# Patient Record
Sex: MALE | Race: WHITE | NOT HISPANIC OR LATINO | Employment: OTHER | URBAN - NONMETROPOLITAN AREA
[De-identification: names, ages, dates, MRNs, and addresses within clinical notes are randomized per-mention and may not be internally consistent; named-entity substitution may affect disease eponyms.]

---

## 2022-07-09 ENCOUNTER — HOSPITAL ENCOUNTER (INPATIENT)
Facility: HOSPITAL | Age: 72
LOS: 7 days | Discharge: HOME HEALTH | End: 2022-07-16
Attending: INTERNAL MEDICINE | Admitting: FAMILY MEDICINE

## 2022-07-09 ENCOUNTER — APPOINTMENT (OUTPATIENT)
Dept: GENERAL RADIOLOGY | Facility: HOSPITAL | Age: 72
End: 2022-07-09

## 2022-07-09 ENCOUNTER — APPOINTMENT (OUTPATIENT)
Dept: ULTRASOUND IMAGING | Facility: HOSPITAL | Age: 72
End: 2022-07-09

## 2022-07-09 ENCOUNTER — APPOINTMENT (OUTPATIENT)
Dept: CARDIOLOGY | Facility: HOSPITAL | Age: 72
End: 2022-07-09

## 2022-07-09 DIAGNOSIS — Z74.09 IMPAIRED MOBILITY AND ADLS: ICD-10-CM

## 2022-07-09 DIAGNOSIS — Z78.9 IMPAIRED MOBILITY AND ADLS: ICD-10-CM

## 2022-07-09 DIAGNOSIS — Z74.09 IMPAIRED MOBILITY: ICD-10-CM

## 2022-07-09 PROBLEM — E87.70 FLUID OVERLOAD: Status: ACTIVE | Noted: 2022-07-09

## 2022-07-09 LAB
ALBUMIN SERPL-MCNC: 2.7 G/DL (ref 3.5–5.2)
ALBUMIN/GLOB SERPL: 0.8 G/DL
ALP SERPL-CCNC: 122 U/L (ref 39–117)
ALT SERPL W P-5'-P-CCNC: 37 U/L (ref 1–41)
ANION GAP SERPL CALCULATED.3IONS-SCNC: 15 MMOL/L (ref 5–15)
ANION GAP SERPL CALCULATED.3IONS-SCNC: 16 MMOL/L (ref 5–15)
ANION GAP SERPL CALCULATED.3IONS-SCNC: 22 MMOL/L (ref 5–15)
ANISOCYTOSIS BLD QL: ABNORMAL
APTT PPP: 32.8 SECONDS (ref 24.1–35)
ARTERIAL PATENCY WRIST A: ABNORMAL
AST SERPL-CCNC: 18 U/L (ref 1–40)
ATMOSPHERIC PRESS: 749 MMHG
BASE EXCESS BLDA CALC-SCNC: -9.8 MMOL/L (ref 0–2)
BDY SITE: ABNORMAL
BH CV ECHO MEAS - AO MAX PG: 12.5 MMHG
BH CV ECHO MEAS - AO MEAN PG: 6 MMHG
BH CV ECHO MEAS - AO ROOT DIAM: 2.4 CM
BH CV ECHO MEAS - AO V2 MAX: 177 CM/SEC
BH CV ECHO MEAS - AO V2 VTI: 33 CM
BH CV ECHO MEAS - AVA(I,D): 2.46 CM2
BH CV ECHO MEAS - EDV(CUBED): 87.5 ML
BH CV ECHO MEAS - EDV(MOD-SP4): 121 ML
BH CV ECHO MEAS - EF(MOD-SP4): 68.3 %
BH CV ECHO MEAS - ESV(CUBED): 9.4 ML
BH CV ECHO MEAS - ESV(MOD-SP4): 38.3 ML
BH CV ECHO MEAS - FS: 52.5 %
BH CV ECHO MEAS - IVS/LVPW: 0.93 CM
BH CV ECHO MEAS - IVSD: 1 CM
BH CV ECHO MEAS - LA DIMENSION: 3.7 CM
BH CV ECHO MEAS - LAT PEAK E' VEL: 10.1 CM/SEC
BH CV ECHO MEAS - LV DIASTOLIC VOL/BSA (35-75): 60.3 CM2
BH CV ECHO MEAS - LV MASS(C)D: 157.3 GRAMS
BH CV ECHO MEAS - LV MAX PG: 6.5 MMHG
BH CV ECHO MEAS - LV MEAN PG: 4 MMHG
BH CV ECHO MEAS - LV SYSTOLIC VOL/BSA (12-30): 19.1 CM2
BH CV ECHO MEAS - LV V1 MAX: 127 CM/SEC
BH CV ECHO MEAS - LV V1 VTI: 25.8 CM
BH CV ECHO MEAS - LVIDD: 4.4 CM
BH CV ECHO MEAS - LVIDS: 2.11 CM
BH CV ECHO MEAS - LVOT AREA: 3.1 CM2
BH CV ECHO MEAS - LVOT DIAM: 2 CM
BH CV ECHO MEAS - LVPWD: 1.07 CM
BH CV ECHO MEAS - MED PEAK E' VEL: 6.6 CM/SEC
BH CV ECHO MEAS - MV A MAX VEL: 102 CM/SEC
BH CV ECHO MEAS - MV DEC TIME: 0.19 MSEC
BH CV ECHO MEAS - MV E MAX VEL: 122 CM/SEC
BH CV ECHO MEAS - MV E/A: 1.2
BH CV ECHO MEAS - RAP SYSTOLE: 5 MMHG
BH CV ECHO MEAS - RVSP: 55.1 MMHG
BH CV ECHO MEAS - SI(MOD-SP4): 41.2 ML/M2
BH CV ECHO MEAS - SV(LVOT): 81.1 ML
BH CV ECHO MEAS - SV(MOD-SP4): 82.7 ML
BH CV ECHO MEAS - TR MAX PG: 50.1 MMHG
BH CV ECHO MEAS - TR MAX VEL: 354 CM/SEC
BH CV ECHO MEASUREMENTS AVERAGE E/E' RATIO: 14.61
BILIRUB SERPL-MCNC: 0.3 MG/DL (ref 0–1.2)
BILIRUB UR QL STRIP: NEGATIVE
BODY TEMPERATURE: 37 C
BUN SERPL-MCNC: 50 MG/DL (ref 8–23)
BUN SERPL-MCNC: 50 MG/DL (ref 8–23)
BUN SERPL-MCNC: 51 MG/DL (ref 8–23)
BUN/CREAT SERPL: 17 (ref 7–25)
BUN/CREAT SERPL: 17.6 (ref 7–25)
BUN/CREAT SERPL: 18.4 (ref 7–25)
BURR CELLS BLD QL SMEAR: ABNORMAL
CALCIUM SPEC-SCNC: 8 MG/DL (ref 8.6–10.5)
CALCIUM SPEC-SCNC: 8.3 MG/DL (ref 8.6–10.5)
CALCIUM SPEC-SCNC: 8.4 MG/DL (ref 8.6–10.5)
CHLORIDE SERPL-SCNC: 98 MMOL/L (ref 98–107)
CHLORIDE SERPL-SCNC: 99 MMOL/L (ref 98–107)
CHLORIDE SERPL-SCNC: 99 MMOL/L (ref 98–107)
CLARITY UR: CLEAR
CO2 SERPL-SCNC: 12 MMOL/L (ref 22–29)
CO2 SERPL-SCNC: 17 MMOL/L (ref 22–29)
CO2 SERPL-SCNC: 21 MMOL/L (ref 22–29)
COLOR UR: YELLOW
CREAT SERPL-MCNC: 2.77 MG/DL (ref 0.76–1.27)
CREAT SERPL-MCNC: 2.84 MG/DL (ref 0.76–1.27)
CREAT SERPL-MCNC: 2.94 MG/DL (ref 0.76–1.27)
CREAT UR-MCNC: 104.2 MG/DL
CRP SERPL-MCNC: 19.55 MG/DL (ref 0–0.5)
D-LACTATE SERPL-SCNC: 1.2 MMOL/L (ref 0.5–2)
DEPRECATED RDW RBC AUTO: 57.2 FL (ref 37–54)
EGFRCR SERPLBLD CKD-EPI 2021: 22.2 ML/MIN/1.73
EGFRCR SERPLBLD CKD-EPI 2021: 23.1 ML/MIN/1.73
EGFRCR SERPLBLD CKD-EPI 2021: 23.8 ML/MIN/1.73
EOSINOPHIL # BLD MANUAL: 0.63 10*3/MM3 (ref 0–0.4)
EOSINOPHIL NFR BLD MANUAL: 4 % (ref 0.3–6.2)
ERYTHROCYTE [DISTWIDTH] IN BLOOD BY AUTOMATED COUNT: 17 % (ref 12.3–15.4)
GAS FLOW AIRWAY: 4 LPM
GLOBULIN UR ELPH-MCNC: 3.6 GM/DL
GLUCOSE BLDC GLUCOMTR-MCNC: 139 MG/DL (ref 70–130)
GLUCOSE BLDC GLUCOMTR-MCNC: 171 MG/DL (ref 70–130)
GLUCOSE BLDC GLUCOMTR-MCNC: 233 MG/DL (ref 70–130)
GLUCOSE BLDC GLUCOMTR-MCNC: 255 MG/DL (ref 70–130)
GLUCOSE BLDC GLUCOMTR-MCNC: 313 MG/DL (ref 70–130)
GLUCOSE BLDC GLUCOMTR-MCNC: 324 MG/DL (ref 70–130)
GLUCOSE BLDC GLUCOMTR-MCNC: 394 MG/DL (ref 70–130)
GLUCOSE BLDC GLUCOMTR-MCNC: 400 MG/DL (ref 70–130)
GLUCOSE BLDC GLUCOMTR-MCNC: 433 MG/DL (ref 70–130)
GLUCOSE BLDC GLUCOMTR-MCNC: 539 MG/DL (ref 70–130)
GLUCOSE BLDC GLUCOMTR-MCNC: 547 MG/DL (ref 70–130)
GLUCOSE SERPL-MCNC: 367 MG/DL (ref 65–99)
GLUCOSE SERPL-MCNC: 487 MG/DL (ref 65–99)
GLUCOSE SERPL-MCNC: 494 MG/DL (ref 65–99)
GLUCOSE SERPL-MCNC: 494 MG/DL (ref 65–99)
GLUCOSE UR STRIP-MCNC: ABNORMAL MG/DL
HBA1C MFR BLD: 7.5 % (ref 4.8–5.6)
HCO3 BLDA-SCNC: 18.6 MMOL/L (ref 20–26)
HCT VFR BLD AUTO: 28.3 % (ref 37.5–51)
HGB BLD-MCNC: 8.2 G/DL (ref 13–17.7)
HGB UR QL STRIP.AUTO: NEGATIVE
INR PPP: 1.2 (ref 0.91–1.09)
KETONES UR QL STRIP: ABNORMAL
LEFT ATRIUM VOLUME INDEX: 32.8 ML/M2
LEFT ATRIUM VOLUME: 66 ML
LEUKOCYTE ESTERASE UR QL STRIP.AUTO: NEGATIVE
LYMPHOCYTES # BLD MANUAL: 0.94 10*3/MM3 (ref 0.7–3.1)
LYMPHOCYTES NFR BLD MANUAL: 4 % (ref 5–12)
Lab: ABNORMAL
Lab: ABNORMAL
MAXIMAL PREDICTED HEART RATE: 150 BPM
MCH RBC QN AUTO: 27 PG (ref 26.6–33)
MCHC RBC AUTO-ENTMCNC: 29 G/DL (ref 31.5–35.7)
MCV RBC AUTO: 93.1 FL (ref 79–97)
MODALITY: ABNORMAL
MONOCYTES # BLD: 0.63 10*3/MM3 (ref 0.1–0.9)
MYELOCYTES NFR BLD MANUAL: 1 % (ref 0–0)
NEUTROPHILS # BLD AUTO: 13.29 10*3/MM3 (ref 1.7–7)
NEUTROPHILS NFR BLD MANUAL: 85 % (ref 42.7–76)
NITRITE UR QL STRIP: NEGATIVE
NOTIFIED BY: ABNORMAL
NOTIFIED WHO: ABNORMAL
PCO2 BLDA: 52.3 MM HG (ref 35–45)
PCO2 TEMP ADJ BLD: 52.3 MM HG (ref 35–45)
PH BLDA: 7.16 PH UNITS (ref 7.35–7.45)
PH UR STRIP.AUTO: <=5 [PH] (ref 5–8)
PH, TEMP CORRECTED: 7.16 PH UNITS (ref 7.35–7.45)
PLAT MORPH BLD: NORMAL
PLATELET # BLD AUTO: 252 10*3/MM3 (ref 140–450)
PMV BLD AUTO: 11 FL (ref 6–12)
PO2 BLDA: 67 MM HG (ref 83–108)
PO2 TEMP ADJ BLD: 67 MM HG (ref 83–108)
POIKILOCYTOSIS BLD QL SMEAR: ABNORMAL
POLYCHROMASIA BLD QL SMEAR: ABNORMAL
POTASSIUM SERPL-SCNC: 6 MMOL/L (ref 3.5–5.2)
POTASSIUM SERPL-SCNC: 6 MMOL/L (ref 3.5–5.2)
POTASSIUM SERPL-SCNC: 6.8 MMOL/L (ref 3.5–5.2)
PROCALCITONIN SERPL-MCNC: 1.2 NG/ML (ref 0–0.25)
PROT SERPL-MCNC: 6.3 G/DL (ref 6–8.5)
PROT UR QL STRIP: NEGATIVE
PROTHROMBIN TIME: 14.7 SECONDS (ref 11.9–14.6)
RBC # BLD AUTO: 3.04 10*6/MM3 (ref 4.14–5.8)
SAO2 % BLDCOA: 91.3 % (ref 94–99)
SODIUM SERPL-SCNC: 131 MMOL/L (ref 136–145)
SODIUM SERPL-SCNC: 132 MMOL/L (ref 136–145)
SODIUM SERPL-SCNC: 136 MMOL/L (ref 136–145)
SODIUM UR-SCNC: 30 MMOL/L
SP GR UR STRIP: 1.02 (ref 1–1.03)
STRESS TARGET HR: 128 BPM
TROPONIN T SERPL-MCNC: <0.01 NG/ML (ref 0–0.03)
TROPONIN T SERPL-MCNC: <0.01 NG/ML (ref 0–0.03)
UROBILINOGEN UR QL STRIP: ABNORMAL
UUN 24H UR-MCNC: 419 MG/DL
VARIANT LYMPHS NFR BLD MANUAL: 6 % (ref 19.6–45.3)
VENTILATOR MODE: ABNORMAL
WBC MORPH BLD: NORMAL
WBC NRBC COR # BLD: 15.63 10*3/MM3 (ref 3.4–10.8)

## 2022-07-09 PROCEDURE — 84300 ASSAY OF URINE SODIUM: CPT | Performed by: INTERNAL MEDICINE

## 2022-07-09 PROCEDURE — 85610 PROTHROMBIN TIME: CPT | Performed by: INTERNAL MEDICINE

## 2022-07-09 PROCEDURE — 82962 GLUCOSE BLOOD TEST: CPT

## 2022-07-09 PROCEDURE — 63710000001 INSULIN REGULAR HUMAN PER 5 UNITS: Performed by: INTERNAL MEDICINE

## 2022-07-09 PROCEDURE — 94799 UNLISTED PULMONARY SVC/PX: CPT

## 2022-07-09 PROCEDURE — 94640 AIRWAY INHALATION TREATMENT: CPT

## 2022-07-09 PROCEDURE — 93010 ELECTROCARDIOGRAM REPORT: CPT | Performed by: INTERNAL MEDICINE

## 2022-07-09 PROCEDURE — 83605 ASSAY OF LACTIC ACID: CPT | Performed by: INTERNAL MEDICINE

## 2022-07-09 PROCEDURE — 83036 HEMOGLOBIN GLYCOSYLATED A1C: CPT | Performed by: INTERNAL MEDICINE

## 2022-07-09 PROCEDURE — 86140 C-REACTIVE PROTEIN: CPT | Performed by: INTERNAL MEDICINE

## 2022-07-09 PROCEDURE — 93306 TTE W/DOPPLER COMPLETE: CPT

## 2022-07-09 PROCEDURE — 85007 BL SMEAR W/DIFF WBC COUNT: CPT | Performed by: INTERNAL MEDICINE

## 2022-07-09 PROCEDURE — 84484 ASSAY OF TROPONIN QUANT: CPT | Performed by: INTERNAL MEDICINE

## 2022-07-09 PROCEDURE — 81003 URINALYSIS AUTO W/O SCOPE: CPT | Performed by: INTERNAL MEDICINE

## 2022-07-09 PROCEDURE — 36600 WITHDRAWAL OF ARTERIAL BLOOD: CPT

## 2022-07-09 PROCEDURE — 80053 COMPREHEN METABOLIC PANEL: CPT | Performed by: INTERNAL MEDICINE

## 2022-07-09 PROCEDURE — 25010000002 FUROSEMIDE PER 20 MG: Performed by: INTERNAL MEDICINE

## 2022-07-09 PROCEDURE — 82803 BLOOD GASES ANY COMBINATION: CPT

## 2022-07-09 PROCEDURE — 84540 ASSAY OF URINE/UREA-N: CPT | Performed by: INTERNAL MEDICINE

## 2022-07-09 PROCEDURE — 93970 EXTREMITY STUDY: CPT | Performed by: SURGERY

## 2022-07-09 PROCEDURE — 82570 ASSAY OF URINE CREATININE: CPT | Performed by: INTERNAL MEDICINE

## 2022-07-09 PROCEDURE — 87040 BLOOD CULTURE FOR BACTERIA: CPT | Performed by: INTERNAL MEDICINE

## 2022-07-09 PROCEDURE — 25010000002 CALCIUM GLUCONATE PER 10 ML: Performed by: INTERNAL MEDICINE

## 2022-07-09 PROCEDURE — 93306 TTE W/DOPPLER COMPLETE: CPT | Performed by: INTERNAL MEDICINE

## 2022-07-09 PROCEDURE — 25010000002 ONDANSETRON PER 1 MG: Performed by: INTERNAL MEDICINE

## 2022-07-09 PROCEDURE — 0 INSULIN REGULAR HUMAN PER 5 UNITS: Performed by: INTERNAL MEDICINE

## 2022-07-09 PROCEDURE — 84145 PROCALCITONIN (PCT): CPT | Performed by: INTERNAL MEDICINE

## 2022-07-09 PROCEDURE — 71045 X-RAY EXAM CHEST 1 VIEW: CPT

## 2022-07-09 PROCEDURE — 93005 ELECTROCARDIOGRAM TRACING: CPT | Performed by: INTERNAL MEDICINE

## 2022-07-09 PROCEDURE — 85730 THROMBOPLASTIN TIME PARTIAL: CPT | Performed by: INTERNAL MEDICINE

## 2022-07-09 PROCEDURE — 94660 CPAP INITIATION&MGMT: CPT

## 2022-07-09 PROCEDURE — 25010000002 CEFTRIAXONE PER 250 MG: Performed by: INTERNAL MEDICINE

## 2022-07-09 PROCEDURE — 85025 COMPLETE CBC W/AUTO DIFF WBC: CPT | Performed by: INTERNAL MEDICINE

## 2022-07-09 PROCEDURE — 25010000002 PERFLUTREN 6.52 MG/ML SUSPENSION: Performed by: INTERNAL MEDICINE

## 2022-07-09 PROCEDURE — 93970 EXTREMITY STUDY: CPT

## 2022-07-09 RX ORDER — ACETAMINOPHEN 325 MG/1
650 TABLET ORAL EVERY 6 HOURS PRN
Status: DISCONTINUED | OUTPATIENT
Start: 2022-07-09 | End: 2022-07-16 | Stop reason: HOSPADM

## 2022-07-09 RX ORDER — SODIUM CHLORIDE 0.9 % (FLUSH) 0.9 %
10 SYRINGE (ML) INJECTION EVERY 12 HOURS SCHEDULED
Status: DISCONTINUED | OUTPATIENT
Start: 2022-07-09 | End: 2022-07-16 | Stop reason: HOSPADM

## 2022-07-09 RX ORDER — DEXTROSE MONOHYDRATE 25 G/50ML
25 INJECTION, SOLUTION INTRAVENOUS
Status: DISCONTINUED | OUTPATIENT
Start: 2022-07-09 | End: 2022-07-14 | Stop reason: SDUPTHER

## 2022-07-09 RX ORDER — IPRATROPIUM BROMIDE AND ALBUTEROL SULFATE 2.5; .5 MG/3ML; MG/3ML
3 SOLUTION RESPIRATORY (INHALATION)
Status: DISCONTINUED | OUTPATIENT
Start: 2022-07-09 | End: 2022-07-16 | Stop reason: HOSPADM

## 2022-07-09 RX ORDER — ONDANSETRON 2 MG/ML
4 INJECTION INTRAMUSCULAR; INTRAVENOUS EVERY 6 HOURS PRN
Status: DISCONTINUED | OUTPATIENT
Start: 2022-07-09 | End: 2022-07-16 | Stop reason: HOSPADM

## 2022-07-09 RX ORDER — NICOTINE POLACRILEX 4 MG
15 LOZENGE BUCCAL
Status: DISCONTINUED | OUTPATIENT
Start: 2022-07-09 | End: 2022-07-14 | Stop reason: SDUPTHER

## 2022-07-09 RX ORDER — FAMOTIDINE 10 MG/ML
20 INJECTION, SOLUTION INTRAVENOUS DAILY
Status: DISCONTINUED | OUTPATIENT
Start: 2022-07-10 | End: 2022-07-11

## 2022-07-09 RX ORDER — ALPRAZOLAM 0.5 MG/1
1 TABLET ORAL 3 TIMES DAILY PRN
Status: DISPENSED | OUTPATIENT
Start: 2022-07-09 | End: 2022-07-16

## 2022-07-09 RX ORDER — SODIUM CHLORIDE 0.9 % (FLUSH) 0.9 %
10 SYRINGE (ML) INJECTION EVERY 12 HOURS SCHEDULED
Status: DISCONTINUED | OUTPATIENT
Start: 2022-07-09 | End: 2022-07-10

## 2022-07-09 RX ORDER — INSULIN LISPRO 100 [IU]/ML
0-24 INJECTION, SOLUTION INTRAVENOUS; SUBCUTANEOUS
Status: DISCONTINUED | OUTPATIENT
Start: 2022-07-09 | End: 2022-07-09

## 2022-07-09 RX ORDER — SODIUM CHLORIDE 0.9 % (FLUSH) 0.9 %
10 SYRINGE (ML) INJECTION AS NEEDED
Status: DISCONTINUED | OUTPATIENT
Start: 2022-07-09 | End: 2022-07-16 | Stop reason: HOSPADM

## 2022-07-09 RX ORDER — DEXTROSE MONOHYDRATE 25 G/50ML
10-50 INJECTION, SOLUTION INTRAVENOUS
Status: DISCONTINUED | OUTPATIENT
Start: 2022-07-09 | End: 2022-07-09 | Stop reason: SDUPTHER

## 2022-07-09 RX ORDER — FUROSEMIDE 10 MG/ML
40 INJECTION INTRAMUSCULAR; INTRAVENOUS EVERY 12 HOURS SCHEDULED
Status: DISCONTINUED | OUTPATIENT
Start: 2022-07-09 | End: 2022-07-09

## 2022-07-09 RX ORDER — SODIUM CHLORIDE 450 MG/100ML
125 INJECTION, SOLUTION INTRAVENOUS CONTINUOUS
Status: DISCONTINUED | OUTPATIENT
Start: 2022-07-09 | End: 2022-07-10

## 2022-07-09 RX ORDER — SODIUM CHLORIDE 0.9 % (FLUSH) 0.9 %
10 SYRINGE (ML) INJECTION AS NEEDED
Status: DISCONTINUED | OUTPATIENT
Start: 2022-07-09 | End: 2022-07-14 | Stop reason: SDUPTHER

## 2022-07-09 RX ORDER — DEXTROSE AND SODIUM CHLORIDE 5; .45 G/100ML; G/100ML
75 INJECTION, SOLUTION INTRAVENOUS CONTINUOUS
Status: DISCONTINUED | OUTPATIENT
Start: 2022-07-09 | End: 2022-07-10

## 2022-07-09 RX ORDER — FAMOTIDINE 10 MG/ML
20 INJECTION, SOLUTION INTRAVENOUS EVERY 12 HOURS SCHEDULED
Status: DISCONTINUED | OUTPATIENT
Start: 2022-07-09 | End: 2022-07-09

## 2022-07-09 RX ORDER — FUROSEMIDE 10 MG/ML
40 INJECTION INTRAMUSCULAR; INTRAVENOUS EVERY 12 HOURS SCHEDULED
Status: DISCONTINUED | OUTPATIENT
Start: 2022-07-09 | End: 2022-07-12

## 2022-07-09 RX ORDER — OXYCODONE HYDROCHLORIDE 5 MG/1
5 TABLET ORAL EVERY 8 HOURS PRN
Status: DISPENSED | OUTPATIENT
Start: 2022-07-09 | End: 2022-07-16

## 2022-07-09 RX ORDER — FUROSEMIDE 10 MG/ML
40 INJECTION INTRAMUSCULAR; INTRAVENOUS EVERY 12 HOURS
Status: DISCONTINUED | OUTPATIENT
Start: 2022-07-09 | End: 2022-07-09

## 2022-07-09 RX ORDER — NICOTINE POLACRILEX 4 MG
15 LOZENGE BUCCAL
Status: DISCONTINUED | OUTPATIENT
Start: 2022-07-09 | End: 2022-07-09 | Stop reason: SDUPTHER

## 2022-07-09 RX ORDER — DEXTROSE MONOHYDRATE 25 G/50ML
50 INJECTION, SOLUTION INTRAVENOUS ONCE
Status: COMPLETED | OUTPATIENT
Start: 2022-07-09 | End: 2022-07-09

## 2022-07-09 RX ADMIN — IPRATROPIUM BROMIDE AND ALBUTEROL SULFATE 3 ML: 2.5; .5 SOLUTION RESPIRATORY (INHALATION) at 19:10

## 2022-07-09 RX ADMIN — SODIUM CHLORIDE 6.8 UNITS/HR: 9 INJECTION, SOLUTION INTRAVENOUS at 17:17

## 2022-07-09 RX ADMIN — SODIUM CHLORIDE 1 G: 9 INJECTION, SOLUTION INTRAVENOUS at 13:01

## 2022-07-09 RX ADMIN — SODIUM BICARBONATE 50 MEQ: 84 INJECTION INTRAVENOUS at 17:23

## 2022-07-09 RX ADMIN — FUROSEMIDE 40 MG: 10 INJECTION, SOLUTION INTRAVENOUS at 13:02

## 2022-07-09 RX ADMIN — SODIUM ZIRCONIUM CYCLOSILICATE 10 G: 10 POWDER, FOR SUSPENSION ORAL at 21:17

## 2022-07-09 RX ADMIN — INSULIN HUMAN 10 UNITS: 100 INJECTION, SOLUTION PARENTERAL at 13:01

## 2022-07-09 RX ADMIN — INSULIN HUMAN 10 UNITS: 100 INJECTION, SOLUTION PARENTERAL at 13:49

## 2022-07-09 RX ADMIN — PERFLUTREN 8.48 MG: 6.52 INJECTION, SUSPENSION INTRAVENOUS at 13:26

## 2022-07-09 RX ADMIN — ALPRAZOLAM 1 MG: 0.5 TABLET ORAL at 16:14

## 2022-07-09 RX ADMIN — DEXTROSE AND SODIUM CHLORIDE 75 ML/HR: 5; 450 INJECTION, SOLUTION INTRAVENOUS at 23:16

## 2022-07-09 RX ADMIN — DOXYCYCLINE 100 MG: 100 INJECTION, POWDER, LYOPHILIZED, FOR SOLUTION INTRAVENOUS at 14:59

## 2022-07-09 RX ADMIN — Medication 10 ML: at 13:02

## 2022-07-09 RX ADMIN — FUROSEMIDE 40 MG: 10 INJECTION, SOLUTION INTRAMUSCULAR; INTRAVENOUS at 21:16

## 2022-07-09 RX ADMIN — Medication 10 ML: at 21:20

## 2022-07-09 RX ADMIN — ONDANSETRON 4 MG: 2 INJECTION INTRAMUSCULAR; INTRAVENOUS at 18:18

## 2022-07-09 RX ADMIN — SODIUM ZIRCONIUM CYCLOSILICATE 10 G: 10 POWDER, FOR SUSPENSION ORAL at 16:57

## 2022-07-09 RX ADMIN — CALCIUM GLUCONATE 1 G: 98 INJECTION, SOLUTION INTRAVENOUS at 13:02

## 2022-07-09 RX ADMIN — FAMOTIDINE 20 MG: 10 INJECTION, SOLUTION INTRAVENOUS at 13:00

## 2022-07-09 RX ADMIN — SODIUM CHLORIDE 125 ML/HR: 4.5 INJECTION, SOLUTION INTRAVENOUS at 18:37

## 2022-07-09 RX ADMIN — SODIUM BICARBONATE: 84 INJECTION, SOLUTION INTRAVENOUS at 18:17

## 2022-07-09 RX ADMIN — DEXTROSE MONOHYDRATE 50 ML: 25 INJECTION, SOLUTION INTRAVENOUS at 13:02

## 2022-07-09 RX ADMIN — IPRATROPIUM BROMIDE AND ALBUTEROL SULFATE 3 ML: 2.5; .5 SOLUTION RESPIRATORY (INHALATION) at 15:12

## 2022-07-09 NOTE — NURSING NOTE
Patient has right femoral line from outside hospital. Dressing changed and lumens replaced. Per Dr. Strange keep current line instead of replacing it.

## 2022-07-09 NOTE — CONSULTS
2 PIV attempts using US guidance, unsuccessful; all other options are too deep for PIV - informed primary RN as well as MD.

## 2022-07-09 NOTE — H&P
[facility] Medicine Services  HISTORY AND PHYSICAL    Date of Admission: 7/9/2022  Primary Care Physician: Mumtaz Teresa PA    Subjective     Chief Complaint: Transfer from outside hospital for concern of sepsis and pneumonia.  History of Present Illness  Patient is a transfer from [facility].  Patient is transferred here with a diagnosis of sepsis from pneumonia  Patient reportedly hypotensive and had high blood sugar.  She was transferred here on 4 L oxygen  He has 22-gauge IV left hand and a right femoral central line  Initial vital signs were 120/45, heart rate 88 respiratory rate 18 blood sugar was 514 with saturation 97% on 4 L  This information from my understanding is from EMS  Patient has polypharmacy and has 23 medications listed from the ER  Patient received 10 units of regular insulin 1 L of normal saline bolus followed by 200 mL/h, Merrem, vancomycin prior to transfer.  Patient reportedly came with nausea vomiting that started 3 AM.  Record indicates history of chronic back pain, diabetes, hyperlipidemia, hypertension, opiates (morphine 15 mg 3 times a day, oxycodone every 8 hourly as needed  Patient was scheduled for corona virus in the emergency room.  Lowest blood pressure reading noted was 96/42 at 2122-hour on July 8.    Diagnostic studies from transferring facility:  Blood gas 7.32 with PCO2 of 44 with saturation of 94% on 4 L nasal cannula  Culture was drawn from outside hospital  Potassium was 6.2 (I did not see any record that it was stated)  Patient had hemoglobin of 6.3.  There is no reported bleeding  endorsed from the information that I have read.  Patient reportedly received a unit of packed RBC  BUN is 46 with creatinine of 3.3  Albumin is 1.9, calcium is 7.8    WBCount 15.7  Lactic acid is 1  Glucosuria present  Specific gravity 1.025  Negative nitrite leukocyte Estrace trace no significant hematuria or pyuria  proBNP 2939  Chest x-ray  "reportedly with interval increase in bibasilar \"pneumonia\" and small left pleural effusion.        Review of Systems   Limited. Patient recollection poor.   Very weak      Otherwise complete ROS reviewed and negative except as mentioned in the HPI.    Past Medical History:   Past Medical History:   Diagnosis Date   • Chronic low back pain    • CKD (chronic kidney disease)    • COPD (chronic obstructive pulmonary disease) ([facility])    • Coronary arteriosclerosis    • Diabetes mellitus ([facility])    • Gastroparesis    • GERD (gastroesophageal reflux disease)    • Hearing loss    • Hypertension    • Obesity    • Oxygen dependent      Past Surgical History:  Past Surgical History:   Procedure Laterality Date   • CORONARY ARTERY BYPASS GRAFT     • THORACOSCOPY  2019   • TONSILLECTOMY       Social History:  reports that he has quit smoking. He has never used smokeless tobacco.    Family History:  Currently not obtainable. Son not familiar.  Allergies:  Allergies   Allergen Reactions   • Ace Inhibitors Other (See Comments)     unknown   • Bactroban [Mupirocin Calcium] Other (See Comments)     Unknown    • Bentyl [Dicyclomine Hcl] Nausea Only   • Codeine Nausea And Vomiting   • Cymbalta [Duloxetine Hcl] Other (See Comments)     Unknown    • Flagyl [Metronidazole] Other (See Comments)     Unknown    • Nifedipine Other (See Comments)     unknown   • Nsaids Other (See Comments)     Unknown    • Penicillins Unknown - High Severity   • Reglan [Metoclopramide Hcl] Nausea Only   • Sulfa Antibiotics Unknown - High Severity   • Tetanus-Diphth-Acell Pertussis Rash       Medications:  There are 23 medications listed in the ER  That includes pain medication such as morphine, oxycodone vortioxetine  Antidiabetic medications  Prior to Admission medications    Not on File     I have utilized all available immediate resources to obtain, update, and review the patient's current medications.    Objective     Vital Signs: /48   Pulse 84   Temp " "97.8 °F (36.6 °C) (Oral)   Resp 14   Ht 162.6 cm (64\")   Wt 96.4 kg (212 lb 8.4 oz)   SpO2 99%   BMI 36.48 kg/m²   Physical Exam   Appears chronically ill  Patient's face is very swollen  GEN: Awake, alert, interactive, in NAD  HEENT: Atraumatic, PERRLA, EOMI, Anicteric, Trachea midline, lateral posterior pharyngeal wall, no gross macroglossia  Short neck, no stridor  Lungs: CTAB, no wheezing/rales/rhonchi  Heart: RRR, +S1/s2, no rub  ABD: soft, nt/nd, +BS, no guarding/rebound  Extremities: atraumatic, no cyanosis, positive pitting edema flank and other dependent portions including legs  Skin: Scaling/flaking of skin of lower extremities   Neuro: AAOx3, no focal deficits  Psych: normal mood & affect\      Results Reviewed:  Lab Results (last 24 hours)     Procedure Component Value Units Date/Time    Lactic Acid, Plasma [[id number]]  (Normal) Collected: 07/09/22 1053    Specimen: Blood Updated: 07/09/22 1114     Lactate 1.2 mmol/L     Urinalysis With Microscopic If Indicated (No Culture) - Urine, Clean Catch [[id number]]  (Abnormal) Collected: 07/09/22 1035    Specimen: Urine, Clean Catch Updated: 07/09/22 1046     Color, UA Yellow     Appearance, UA Clear     pH, UA <=5.0     Specific Gravity, UA 1.017     Glucose, UA >=1000 mg/dL (3+)     Ketones, UA 15 mg/dL (1+)     Bilirubin, UA Negative     Blood, UA Negative     Protein, UA Negative     Leuk Esterase, UA Negative     Nitrite, UA Negative     Urobilinogen, UA 0.2 E.U./dL    Narrative:      Urine microscopic not indicated.    POC Glucose Once [[id number]]  (Abnormal) Collected: 07/09/22 1030    Specimen: Blood Updated: 07/09/22 1042     Glucose 433 mg/dL      Comment: : ZENA Plunkett TylerMeter ID: [id number]           Imaging Results (Last 24 Hours)     Procedure Component Value Units Date/Time    XR Chest 1 View [[id number]] Collected: 07/09/22 1051     Updated: 07/09/22 1056    Narrative:      XR CHEST 1 VW- 7/9/2022 10:39 AM CDT     HISTORY: " pneumonia     COMPARISON: None.     FINDINGS:      Bilateral interlobular septal thickening with subpleural Kerley B lines.  Wedge-shaped patchy infiltrate identified in the right upper lobe with  additional patchy infiltrate in both lower lobes. Blunted left  costophrenic angle reflecting trace effusion or perhaps scarring. Mild  cardiomegaly with central pulmonary vascular congestion. Prior coronary  bypass. Remote granulomatous calcification in the anil and mediastinum.  No acute bony abnormality.       Impression:      1. Interstitial edema present with bilateral peribronchial and  interlobular septal thickening. Multifocal patchy alveolar infiltrate is  well, and both lung bases as well as in the right upper lobe, which may  reflect either atypical pneumonia or areas of developing julio pulmonary  edema.        This report was finalized on 07/09/2022 10:53 by Dr Christian Szymanski, .        I have personally reviewed and interpreted the radiology studies and ECG obtained at time of admission.     Assessment / Plan     Assessment:   There are no active hospital problems to display for this patient.    Very complicated case  Transferred here for concern of pneumonia    Problem list  · Hyperkalemia  · Significant fluid overload; hard to rule out pneumonia currently  · Likely underlying congestive heart failure probably diastolic due to reported hypertension as high as 200  · Acute kidney injury  · Anemia with hemoglobin of 6.3 from transferring facility.  Patient received 1 unit of packed RBC from transferring facility.  Patient has an upcoming endoscopic exam and colonoscopy  · Hyperglycemia in a diabetic  · Morbid obesity  · Chronic O2 dependent/chronic respiratory failure hypoxic  · COPD not grossly in exacerbation  · Progressive generalized weakness    Plan:   Will give treatment for hyperkalemia based on treatment protocol.  Check EKG    Lasix IV every 12 hourly.  Monitor renal function, fluid status, urine  output.  Renal work-up ongoing.  Creatinine improved so far from bolus of fluid given.  Echocardiogram; as needed levophed to maintain systolic blood pressure greater than 90 or mean arterial pressure greater than 65    Check renal ultrasound.  Okay to use condom catheter.  As needed bladder scan.  Discussed about urinary retention protocol as needed    Sliding scale insulin for now check A1c    Oxygen, bronchodilator, will review home medication    Venous ultrasound of lower extremities to rule out DVT bilateral lower extremities.  If negative will place SCD.  Unable to place Lovenox due to severe anemia.  Work-up in progress      Empiric abxs, follow cultures    Stress ulcer proph    Critical care time at least 45 mins  Prognosis guarded    Code Status/Advanced Care Plan: Full code  The patient's surrogate decision maker is wife  I discussed my findings and recommendations with the son and wife. Discussed with nurse Zhao  Estimated length of stay is tbd    The patient was seen and examined by me on     Electronically signed by Fabian Daigle MD, 07/09/22, 11:27 CDT.      [facility] is asking me if patient is septic.  Patient was transferred here because concern for sepsis and pneumonia.  I have a different opinion based on my assessment.  Patient is severely fluid overloaded and from reviewing the preliminary echo at bedside patient has right ventricular systolic pressure greater than 50 suggesting that patient has pulmonary hypertension.  Again patient is fluid overloaded therefore I did not give patient bolus of fluid.  With a chest x-ray reviewed, it is hard to really tell presence of pneumonia therefore I just placed the patient on empiric antibiotics  I did not use the sepsis protocol due to above reason.  Cultures has been sent from outside hospital.  We ordered an blood culture [id number].        Electronically signed by Fabian Daigle MD, 07/09/22, 4:41 PM CDT.     I revisited the patient as timed  above.  Patient still has persistent hyperkalemia despite the D50, insulin, calcium gluconate.  Apparently the Lokelma ordered earlier got dropped off.  I do not know how.  Patient persist to have hyperglycemia as well.  Current diagnostic studies now shows anion gap of 22 and CO2 of 12.  Glucose 494 despite several episodes of regular insulin given  Given above information, arterial blood gas ordered,.  I will start the patient on insulin drip via Glucomander with goal between 1 40-1 80.  I reordered the Lokelma  I am giving sodium bicarbonate IV 1 ampoule (metabolic acidosis, hyperkalemia)  I did not place the patient on bicarbonate drip because patient is fluid overloaded  I am continuing on the diuretic despite a slight increase in his creatinine  I am concerned that he may end up needing dialysis if potassium, renal function continued to worsen.  Therefore I am consulting nephrology now    Official echocardiogram now available as outlined below with normal EF but as mentioned above has a mildly reduced right ventricular systolic function and moderate pulmonary hypertension.  Results for orders placed during the hospital encounter of 07/09/22    Adult Transthoracic Echo Complete W/ Cont if Necessary Per Protocol    Interpretation Summary  · Left ventricular ejection fraction appears to be 66 - 70%. Left ventricular systolic function is normal.  · Left ventricular diastolic dysfunction is noted.  · The right ventricular cavity is moderately dilated.  · Mildly reduced right ventricular systolic function noted.  · Moderate pulmonary hypertension is present.    Patient given obesity may have underlying obesity hypoventilation syndrome or sleep apnea.  He is throat is crowded and has narrow opening.  He has short neck.  He has features that fits people with sleep apnea or BETTE.  We will continue on oxygen which she normally uses at 4 L and currently saturation of 92 to 98%.  May need BiPAP during sleep.  Will check  ABG in relation to this as well    Discussed with nurse Zhao    Vitals:    07/09/22 1615   BP: 118/61   Pulse: 85   Resp:    Temp:    SpO2: 94%       Additional diagnosis includes  ·  hyperglycemia in the setting of diabetes  · Anion gap metabolic acidosis  · Persistent hyperkalemia  · Concern for obesity hypoventilation/obstructive sleep apnea  · Pulmonary hypertension with right-sided heart failure as evidenced by fluid retention    We need to recheck again basic metabolic panel to make sure that potassium improved in 4 to 6 hours  Additional critical care time at least 15 minutes.

## 2022-07-09 NOTE — NURSING NOTE
Patient arrived by EMS from [place] at 1004. Dr. Daigle paged upon arrival. Vital signs stable and patient is alert and oriented x4.

## 2022-07-10 ENCOUNTER — APPOINTMENT (OUTPATIENT)
Dept: ULTRASOUND IMAGING | Facility: HOSPITAL | Age: 72
End: 2022-07-10

## 2022-07-10 LAB
ALBUMIN SERPL-MCNC: 2.5 G/DL (ref 3.5–5.2)
ALBUMIN/GLOB SERPL: 0.8 G/DL
ALP SERPL-CCNC: 108 U/L (ref 39–117)
ALT SERPL W P-5'-P-CCNC: 29 U/L (ref 1–41)
ANION GAP SERPL CALCULATED.3IONS-SCNC: 13 MMOL/L (ref 5–15)
ANION GAP SERPL CALCULATED.3IONS-SCNC: 9 MMOL/L (ref 5–15)
ANION GAP SERPL CALCULATED.3IONS-SCNC: 9 MMOL/L (ref 5–15)
ARTERIAL PATENCY WRIST A: ABNORMAL
AST SERPL-CCNC: 15 U/L (ref 1–40)
ATMOSPHERIC PRESS: 751 MMHG
BASE EXCESS BLDA CALC-SCNC: -0.9 MMOL/L (ref 0–2)
BASOPHILS # BLD AUTO: 0.04 10*3/MM3 (ref 0–0.2)
BASOPHILS NFR BLD AUTO: 0.2 % (ref 0–1.5)
BDY SITE: ABNORMAL
BILIRUB SERPL-MCNC: <0.2 MG/DL (ref 0–1.2)
BODY TEMPERATURE: 37 C
BUN SERPL-MCNC: 42 MG/DL (ref 8–23)
BUN SERPL-MCNC: 46 MG/DL (ref 8–23)
BUN SERPL-MCNC: 49 MG/DL (ref 8–23)
BUN/CREAT SERPL: 18.9 (ref 7–25)
BUN/CREAT SERPL: 20.6 (ref 7–25)
BUN/CREAT SERPL: 20.7 (ref 7–25)
CALCIUM SPEC-SCNC: 8.4 MG/DL (ref 8.6–10.5)
CALCIUM SPEC-SCNC: 8.5 MG/DL (ref 8.6–10.5)
CALCIUM SPEC-SCNC: 8.5 MG/DL (ref 8.6–10.5)
CHLORIDE SERPL-SCNC: 100 MMOL/L (ref 98–107)
CHLORIDE SERPL-SCNC: 102 MMOL/L (ref 98–107)
CHLORIDE SERPL-SCNC: 99 MMOL/L (ref 98–107)
CO2 SERPL-SCNC: 23 MMOL/L (ref 22–29)
CO2 SERPL-SCNC: 25 MMOL/L (ref 22–29)
CO2 SERPL-SCNC: 26 MMOL/L (ref 22–29)
CREAT SERPL-MCNC: 2.03 MG/DL (ref 0.76–1.27)
CREAT SERPL-MCNC: 2.38 MG/DL (ref 0.76–1.27)
CREAT SERPL-MCNC: 2.43 MG/DL (ref 0.76–1.27)
DEPRECATED RDW RBC AUTO: 56.7 FL (ref 37–54)
EGFRCR SERPLBLD CKD-EPI 2021: 27.9 ML/MIN/1.73
EGFRCR SERPLBLD CKD-EPI 2021: 28.6 ML/MIN/1.73
EGFRCR SERPLBLD CKD-EPI 2021: 34.6 ML/MIN/1.73
EOSINOPHIL # BLD AUTO: 0.21 10*3/MM3 (ref 0–0.4)
EOSINOPHIL NFR BLD AUTO: 1.3 % (ref 0.3–6.2)
EPAP: 6
ERYTHROCYTE [DISTWIDTH] IN BLOOD BY AUTOMATED COUNT: 16.9 % (ref 12.3–15.4)
GLOBULIN UR ELPH-MCNC: 3.3 GM/DL
GLUCOSE BLDC GLUCOMTR-MCNC: 119 MG/DL (ref 70–130)
GLUCOSE BLDC GLUCOMTR-MCNC: 120 MG/DL (ref 70–130)
GLUCOSE BLDC GLUCOMTR-MCNC: 142 MG/DL (ref 70–130)
GLUCOSE BLDC GLUCOMTR-MCNC: 147 MG/DL (ref 70–130)
GLUCOSE BLDC GLUCOMTR-MCNC: 159 MG/DL (ref 70–130)
GLUCOSE BLDC GLUCOMTR-MCNC: 162 MG/DL (ref 70–130)
GLUCOSE BLDC GLUCOMTR-MCNC: 199 MG/DL (ref 70–130)
GLUCOSE BLDC GLUCOMTR-MCNC: 214 MG/DL (ref 70–130)
GLUCOSE BLDC GLUCOMTR-MCNC: 215 MG/DL (ref 70–130)
GLUCOSE BLDC GLUCOMTR-MCNC: 229 MG/DL (ref 70–130)
GLUCOSE SERPL-MCNC: 135 MG/DL (ref 65–99)
GLUCOSE SERPL-MCNC: 202 MG/DL (ref 65–99)
GLUCOSE SERPL-MCNC: 205 MG/DL (ref 65–99)
HCO3 BLDA-SCNC: 25.6 MMOL/L (ref 20–26)
HCT VFR BLD AUTO: 27.2 % (ref 37.5–51)
HGB BLD-MCNC: 8.3 G/DL (ref 13–17.7)
IMM GRANULOCYTES # BLD AUTO: 0.39 10*3/MM3 (ref 0–0.05)
IMM GRANULOCYTES NFR BLD AUTO: 2.4 % (ref 0–0.5)
INHALED O2 CONCENTRATION: 35 %
IPAP: 14
LYMPHOCYTES # BLD AUTO: 0.99 10*3/MM3 (ref 0.7–3.1)
LYMPHOCYTES NFR BLD AUTO: 6.2 % (ref 19.6–45.3)
Lab: ABNORMAL
MCH RBC QN AUTO: 28 PG (ref 26.6–33)
MCHC RBC AUTO-ENTMCNC: 30.5 G/DL (ref 31.5–35.7)
MCV RBC AUTO: 91.9 FL (ref 79–97)
MODALITY: ABNORMAL
MONOCYTES # BLD AUTO: 0.98 10*3/MM3 (ref 0.1–0.9)
MONOCYTES NFR BLD AUTO: 6.1 % (ref 5–12)
NEUTROPHILS NFR BLD AUTO: 13.43 10*3/MM3 (ref 1.7–7)
NEUTROPHILS NFR BLD AUTO: 83.8 % (ref 42.7–76)
NRBC BLD AUTO-RTO: 0.1 /100 WBC (ref 0–0.2)
PCO2 BLDA: 51.3 MM HG (ref 35–45)
PCO2 TEMP ADJ BLD: 51.3 MM HG (ref 35–45)
PH BLDA: 7.31 PH UNITS (ref 7.35–7.45)
PH, TEMP CORRECTED: 7.31 PH UNITS (ref 7.35–7.45)
PLATELET # BLD AUTO: 296 10*3/MM3 (ref 140–450)
PMV BLD AUTO: 10.9 FL (ref 6–12)
PO2 BLDA: 55.4 MM HG (ref 83–108)
PO2 TEMP ADJ BLD: 55.4 MM HG (ref 83–108)
POTASSIUM SERPL-SCNC: 5.1 MMOL/L (ref 3.5–5.2)
POTASSIUM SERPL-SCNC: 5.2 MMOL/L (ref 3.5–5.2)
POTASSIUM SERPL-SCNC: 5.2 MMOL/L (ref 3.5–5.2)
PROT SERPL-MCNC: 5.8 G/DL (ref 6–8.5)
RBC # BLD AUTO: 2.96 10*6/MM3 (ref 4.14–5.8)
SAO2 % BLDCOA: 88 % (ref 94–99)
SODIUM SERPL-SCNC: 134 MMOL/L (ref 136–145)
SODIUM SERPL-SCNC: 136 MMOL/L (ref 136–145)
SODIUM SERPL-SCNC: 136 MMOL/L (ref 136–145)
VENTILATOR MODE: ABNORMAL
WBC NRBC COR # BLD: 16.04 10*3/MM3 (ref 3.4–10.8)

## 2022-07-10 PROCEDURE — 82803 BLOOD GASES ANY COMBINATION: CPT

## 2022-07-10 PROCEDURE — 63710000001 INSULIN LISPRO (HUMAN) PER 5 UNITS: Performed by: INTERNAL MEDICINE

## 2022-07-10 PROCEDURE — 94664 DEMO&/EVAL PT USE INHALER: CPT

## 2022-07-10 PROCEDURE — 63710000001 INSULIN DETEMIR PER 5 UNITS: Performed by: INTERNAL MEDICINE

## 2022-07-10 PROCEDURE — 80053 COMPREHEN METABOLIC PANEL: CPT | Performed by: INTERNAL MEDICINE

## 2022-07-10 PROCEDURE — 76775 US EXAM ABDO BACK WALL LIM: CPT

## 2022-07-10 PROCEDURE — 94799 UNLISTED PULMONARY SVC/PX: CPT

## 2022-07-10 PROCEDURE — 25010000002 CEFTRIAXONE PER 250 MG: Performed by: INTERNAL MEDICINE

## 2022-07-10 PROCEDURE — 63710000001 PREDNISONE PER 5 MG: Performed by: INTERNAL MEDICINE

## 2022-07-10 PROCEDURE — 36600 WITHDRAWAL OF ARTERIAL BLOOD: CPT

## 2022-07-10 PROCEDURE — 82962 GLUCOSE BLOOD TEST: CPT

## 2022-07-10 PROCEDURE — 25010000002 FUROSEMIDE PER 20 MG: Performed by: INTERNAL MEDICINE

## 2022-07-10 PROCEDURE — 94761 N-INVAS EAR/PLS OXIMETRY MLT: CPT

## 2022-07-10 PROCEDURE — 25010000002 ONDANSETRON PER 1 MG: Performed by: INTERNAL MEDICINE

## 2022-07-10 PROCEDURE — 85025 COMPLETE CBC W/AUTO DIFF WBC: CPT | Performed by: INTERNAL MEDICINE

## 2022-07-10 RX ORDER — INSULIN LISPRO 100 [IU]/ML
0-14 INJECTION, SOLUTION INTRAVENOUS; SUBCUTANEOUS
Status: DISCONTINUED | OUTPATIENT
Start: 2022-07-10 | End: 2022-07-15

## 2022-07-10 RX ORDER — ALPRAZOLAM 1 MG/1
1 TABLET ORAL 3 TIMES DAILY PRN
COMMUNITY

## 2022-07-10 RX ORDER — CARVEDILOL 25 MG/1
25 TABLET ORAL 2 TIMES DAILY WITH MEALS
COMMUNITY

## 2022-07-10 RX ORDER — MIRTAZAPINE 45 MG/1
45 TABLET, FILM COATED ORAL NIGHTLY
COMMUNITY

## 2022-07-10 RX ORDER — INSULIN ASPART 100 [IU]/ML
15 INJECTION, SOLUTION INTRAVENOUS; SUBCUTANEOUS
COMMUNITY

## 2022-07-10 RX ORDER — PHENOL 1.4 %
20 AEROSOL, SPRAY (ML) MUCOUS MEMBRANE NIGHTLY
COMMUNITY

## 2022-07-10 RX ORDER — ATORVASTATIN CALCIUM 40 MG/1
40 TABLET, FILM COATED ORAL NIGHTLY
COMMUNITY

## 2022-07-10 RX ORDER — SPIRONOLACTONE 50 MG/1
50 TABLET, FILM COATED ORAL DAILY
COMMUNITY

## 2022-07-10 RX ORDER — PREDNISONE 1 MG/1
5 TABLET ORAL DAILY
COMMUNITY

## 2022-07-10 RX ORDER — SIMETHICONE 80 MG
80 TABLET,CHEWABLE ORAL 3 TIMES DAILY PRN
Status: DISCONTINUED | OUTPATIENT
Start: 2022-07-10 | End: 2022-07-16 | Stop reason: HOSPADM

## 2022-07-10 RX ORDER — ESOMEPRAZOLE MAGNESIUM 40 MG/1
40 CAPSULE, DELAYED RELEASE ORAL 2 TIMES DAILY
COMMUNITY

## 2022-07-10 RX ORDER — PREDNISONE 1 MG/1
5 TABLET ORAL DAILY
Status: DISCONTINUED | OUTPATIENT
Start: 2022-07-10 | End: 2022-07-16 | Stop reason: HOSPADM

## 2022-07-10 RX ORDER — INSULIN LISPRO 100 [IU]/ML
2-7 INJECTION, SOLUTION INTRAVENOUS; SUBCUTANEOUS EVERY 4 HOURS
Status: DISCONTINUED | OUTPATIENT
Start: 2022-07-10 | End: 2022-07-10

## 2022-07-10 RX ORDER — IPRATROPIUM BROMIDE AND ALBUTEROL SULFATE 2.5; .5 MG/3ML; MG/3ML
3 SOLUTION RESPIRATORY (INHALATION) 4 TIMES DAILY
COMMUNITY

## 2022-07-10 RX ORDER — HYDRALAZINE HYDROCHLORIDE 100 MG/1
100 TABLET, FILM COATED ORAL EVERY 8 HOURS
COMMUNITY

## 2022-07-10 RX ORDER — NICOTINE POLACRILEX 4 MG
15 LOZENGE BUCCAL
Status: DISCONTINUED | OUTPATIENT
Start: 2022-07-10 | End: 2022-07-16 | Stop reason: HOSPADM

## 2022-07-10 RX ORDER — MONTELUKAST SODIUM 10 MG/1
10 TABLET ORAL NIGHTLY
COMMUNITY

## 2022-07-10 RX ORDER — SODIUM CHLORIDE 9 MG/ML
100 INJECTION, SOLUTION INTRAVENOUS CONTINUOUS
Status: DISCONTINUED | OUTPATIENT
Start: 2022-07-10 | End: 2022-07-10

## 2022-07-10 RX ORDER — DOXYCYCLINE HYCLATE 50 MG/1
324 CAPSULE, GELATIN COATED ORAL
COMMUNITY

## 2022-07-10 RX ORDER — TRIAMCINOLONE ACETONIDE 1 MG/G
1 CREAM TOPICAL 2 TIMES DAILY
COMMUNITY

## 2022-07-10 RX ORDER — ASPIRIN 81 MG/1
81 TABLET ORAL DAILY
Status: DISCONTINUED | OUTPATIENT
Start: 2022-07-10 | End: 2022-07-16 | Stop reason: HOSPADM

## 2022-07-10 RX ORDER — OXYCODONE HYDROCHLORIDE 5 MG/1
5 TABLET ORAL EVERY 8 HOURS PRN
COMMUNITY

## 2022-07-10 RX ORDER — VORTIOXETINE 10 MG/1
10 TABLET, FILM COATED ORAL DAILY
COMMUNITY

## 2022-07-10 RX ORDER — ATORVASTATIN CALCIUM 40 MG/1
40 TABLET, FILM COATED ORAL NIGHTLY
Status: DISCONTINUED | OUTPATIENT
Start: 2022-07-10 | End: 2022-07-16 | Stop reason: HOSPADM

## 2022-07-10 RX ORDER — FUROSEMIDE 40 MG/1
40 TABLET ORAL 2 TIMES DAILY
COMMUNITY

## 2022-07-10 RX ORDER — MORPHINE SULFATE 15 MG/1
15 TABLET, FILM COATED, EXTENDED RELEASE ORAL 2 TIMES DAILY
COMMUNITY

## 2022-07-10 RX ORDER — ASPIRIN 81 MG/1
81 TABLET ORAL DAILY
COMMUNITY

## 2022-07-10 RX ORDER — SIMETHICONE 80 MG
80 TABLET,CHEWABLE ORAL 3 TIMES DAILY PRN
COMMUNITY

## 2022-07-10 RX ORDER — INSULIN LISPRO 100 [IU]/ML
2-7 INJECTION, SOLUTION INTRAVENOUS; SUBCUTANEOUS
Status: DISCONTINUED | OUTPATIENT
Start: 2022-07-10 | End: 2022-07-10

## 2022-07-10 RX ORDER — CARVEDILOL 25 MG/1
25 TABLET ORAL 2 TIMES DAILY WITH MEALS
Status: DISCONTINUED | OUTPATIENT
Start: 2022-07-10 | End: 2022-07-16 | Stop reason: HOSPADM

## 2022-07-10 RX ORDER — ONDANSETRON HYDROCHLORIDE 8 MG/1
8 TABLET, FILM COATED ORAL EVERY 8 HOURS PRN
COMMUNITY

## 2022-07-10 RX ORDER — DEXTROSE MONOHYDRATE 25 G/50ML
25 INJECTION, SOLUTION INTRAVENOUS
Status: DISCONTINUED | OUTPATIENT
Start: 2022-07-10 | End: 2022-07-16 | Stop reason: HOSPADM

## 2022-07-10 RX ADMIN — Medication 10 ML: at 20:52

## 2022-07-10 RX ADMIN — DOXYCYCLINE 100 MG: 100 INJECTION, POWDER, LYOPHILIZED, FOR SOLUTION INTRAVENOUS at 03:03

## 2022-07-10 RX ADMIN — CARVEDILOL 25 MG: 25 TABLET, FILM COATED ORAL at 17:44

## 2022-07-10 RX ADMIN — SODIUM ZIRCONIUM CYCLOSILICATE 10 G: 10 POWDER, FOR SUSPENSION ORAL at 08:35

## 2022-07-10 RX ADMIN — ASPIRIN 81 MG: 81 TABLET ORAL at 12:33

## 2022-07-10 RX ADMIN — INSULIN LISPRO 5 UNITS: 100 INJECTION, SOLUTION INTRAVENOUS; SUBCUTANEOUS at 12:33

## 2022-07-10 RX ADMIN — INSULIN DETEMIR 40 UNITS: 100 INJECTION, SOLUTION SUBCUTANEOUS at 20:54

## 2022-07-10 RX ADMIN — Medication 10 ML: at 08:35

## 2022-07-10 RX ADMIN — ONDANSETRON 4 MG: 2 INJECTION INTRAMUSCULAR; INTRAVENOUS at 00:50

## 2022-07-10 RX ADMIN — INSULIN DETEMIR 5 UNITS: 100 INJECTION, SOLUTION SUBCUTANEOUS at 02:56

## 2022-07-10 RX ADMIN — IPRATROPIUM BROMIDE AND ALBUTEROL SULFATE 3 ML: 2.5; .5 SOLUTION RESPIRATORY (INHALATION) at 07:18

## 2022-07-10 RX ADMIN — SODIUM CHLORIDE 1 G: 9 INJECTION, SOLUTION INTRAVENOUS at 12:34

## 2022-07-10 RX ADMIN — IPRATROPIUM BROMIDE AND ALBUTEROL SULFATE 3 ML: 2.5; .5 SOLUTION RESPIRATORY (INHALATION) at 10:42

## 2022-07-10 RX ADMIN — OXYCODONE HYDROCHLORIDE 5 MG: 5 TABLET ORAL at 22:21

## 2022-07-10 RX ADMIN — INSULIN LISPRO 3 UNITS: 100 INJECTION, SOLUTION INTRAVENOUS; SUBCUTANEOUS at 16:37

## 2022-07-10 RX ADMIN — INSULIN LISPRO 3 UNITS: 100 INJECTION, SOLUTION INTRAVENOUS; SUBCUTANEOUS at 04:24

## 2022-07-10 RX ADMIN — OXYCODONE HYDROCHLORIDE 5 MG: 5 TABLET ORAL at 12:34

## 2022-07-10 RX ADMIN — DOXYCYCLINE 100 MG: 100 INJECTION, POWDER, LYOPHILIZED, FOR SOLUTION INTRAVENOUS at 15:24

## 2022-07-10 RX ADMIN — IPRATROPIUM BROMIDE AND ALBUTEROL SULFATE 3 ML: 2.5; .5 SOLUTION RESPIRATORY (INHALATION) at 18:52

## 2022-07-10 RX ADMIN — ACETAMINOPHEN 650 MG: 325 TABLET, FILM COATED ORAL at 17:44

## 2022-07-10 RX ADMIN — PREDNISONE 5 MG: 5 TABLET ORAL at 12:33

## 2022-07-10 RX ADMIN — SODIUM ZIRCONIUM CYCLOSILICATE 10 G: 10 POWDER, FOR SUSPENSION ORAL at 20:48

## 2022-07-10 RX ADMIN — SODIUM CHLORIDE 100 ML/HR: 9 INJECTION, SOLUTION INTRAVENOUS at 04:03

## 2022-07-10 RX ADMIN — IPRATROPIUM BROMIDE AND ALBUTEROL SULFATE 3 ML: 2.5; .5 SOLUTION RESPIRATORY (INHALATION) at 15:20

## 2022-07-10 RX ADMIN — FUROSEMIDE 40 MG: 10 INJECTION, SOLUTION INTRAMUSCULAR; INTRAVENOUS at 16:38

## 2022-07-10 RX ADMIN — FAMOTIDINE 20 MG: 10 INJECTION, SOLUTION INTRAVENOUS at 08:35

## 2022-07-10 RX ADMIN — INSULIN LISPRO 2 UNITS: 100 INJECTION, SOLUTION INTRAVENOUS; SUBCUTANEOUS at 08:39

## 2022-07-10 RX ADMIN — ATORVASTATIN CALCIUM 40 MG: 40 TABLET, FILM COATED ORAL at 20:48

## 2022-07-10 RX ADMIN — FUROSEMIDE 40 MG: 10 INJECTION, SOLUTION INTRAMUSCULAR; INTRAVENOUS at 06:29

## 2022-07-10 RX ADMIN — SODIUM ZIRCONIUM CYCLOSILICATE 10 G: 10 POWDER, FOR SUSPENSION ORAL at 16:37

## 2022-07-10 RX ADMIN — OXYCODONE HYDROCHLORIDE 5 MG: 5 TABLET ORAL at 04:45

## 2022-07-10 NOTE — SIGNIFICANT NOTE
Patient was placed on our Bipap and tolerated it for about 5 minutes before ripping it off. Patient says its smothering him. I tried lower pressures and patient is still not tolerating Bipap. He was placed back on 4L NC

## 2022-07-10 NOTE — CONSULTS
Nephrology ([facility]) Consult Note      Patient:  Isaias Cruz  YOB: 1950  Date of Service: 7/9/2022  MRN: [id number]   Acct: [id number]   Primary Care Physician: Mumtaz Teresa PA  Advance Directive:   Code Status and Medical Interventions:   Ordered at: 07/09/22 1241     Level Of Support Discussed With:    Patient     Code Status (Patient has no pulse and is not breathing):    CPR (Attempt to Resuscitate)     Medical Interventions (Patient has pulse or is breathing):    Full Support     Admit Date: 7/9/2022       Hospital Day: 0  Referring Provider: No Known Provider      Patient personally seen and examined.  Complete chart including Consults, Notes, Operative Reports, Labs, Cardiology, and Radiology studies reviewed as able.        Subjective:  Isaias Cruz is a 71 y.o. male for whom we were consulted for evaluation and treatment of acute kidney injury.  Patient admitted in transfer for evaluation of hyperglycemia with pneumonia.  No baseline labs available for comparison.  Patient has no prior nephrologic evaluations in our office records either.  Was placed on insulin drip for developing DKA.  Denied hematuria, dysuria, hemoptysis.  Other significant issues on admission included anemia, hyperkalemia, severe hypoalbuminemia.  Other complaints included nausea and vomiting prior to admission.  Case reviewed with nursing and primary in person.    Allergies:  Ace inhibitors, Bactroban [mupirocin calcium], Bentyl [dicyclomine hcl], Codeine, Cymbalta [duloxetine hcl], Flagyl [metronidazole], Nifedipine, Nsaids, Penicillins, Reglan [metoclopramide hcl], Sulfa antibiotics, and Tetanus-diphth-acell pertussis    Home Meds:  No medications prior to admission.       Medicines:  Current Facility-Administered Medications   Medication Dose Route Frequency Provider Last Rate Last Admin   • ALPRAZolam (XANAX) tablet 1 mg  1 mg Oral TID PRN Fabian Daigle MD   1 mg at  07/09/22 1614   • cefTRIAXone (ROCEPHIN) 1 g in sodium chloride 0.9 % 100 mL IVPB-VTB  1 g Intravenous Q24H Fabian Daigle  mL/hr at 07/09/22 1301 1 g at 07/09/22 1301   • dextrose (D50W) (25 g/50 mL) IV injection 25 g  25 g Intravenous Q15 Min PRN Fabian Daigle MD       • dextrose (GLUTOSE) oral gel 15 g  15 g Oral Q15 Min PRN Fabian Daigle MD       • doxycycline (VIBRAMYCIN) 100 mg/100 mL 0.9% NS MBP  100 mg Intravenous Q12H Fabian Daigle MD   100 mg at 07/09/22 1459   • [START ON 7/10/2022] famotidine (PEPCID) injection 20 mg  20 mg Intravenous Daily Fabian Daigle MD       • furosemide (LASIX) injection 40 mg  40 mg Intravenous Q12H Fabian Daigle MD       • glucagon (human recombinant) (GLUCAGEN DIAGNOSTIC) injection 1 mg  1 mg Intramuscular Q15 Min PRN Fabian Daigle MD       • insulin regular (HumuLIN R,NovoLIN R) 100 Units in sodium chloride 0.9 % 100 mL (1 Units/mL) infusion  0-100 Units/hr Intravenous Titrated Fabian Daigle MD 8.4 mL/hr at 07/09/22 1822 8.4 Units/hr at 07/09/22 1822   • ipratropium-albuterol (DUO-NEB) nebulizer solution 3 mL  3 mL Nebulization 4x Daily - RT Fabian Daigle MD   3 mL at 07/09/22 1512   • ondansetron (ZOFRAN) injection 4 mg  4 mg Intravenous Q6H PRN Fabian Daigle MD   4 mg at 07/09/22 1818   • oxyCODONE (ROXICODONE) immediate release tablet 5 mg  5 mg Oral Q8H PRN Fabian Daigle MD       • sodium chloride 0.45 % infusion  125 mL/hr Intravenous Continuous Anuj Nguyen  mL/hr at 07/09/22 1837 125 mL/hr at 07/09/22 1837   • sodium chloride 0.9 % flush 10 mL  10 mL Intravenous Q12H Fabian Daigle MD   10 mL at 07/09/22 1302   • sodium chloride 0.9 % flush 10 mL  10 mL Intravenous PRN Fabian Daigle MD       • sodium chloride 0.9 % flush 10 mL  10 mL Intravenous Q12H Fabian Daigle MD       • sodium  chloride 0.9 % flush 10 mL  10 mL Intravenous PRN Fabian Daigle MD       • sodium zirconium cyclosilicate (LOKELMA) pack 10 g  10 g Oral TID Fabian Daigle MD   10 g at 07/09/22 1657       Past Medical History:  Past Medical History:   Diagnosis Date   • Chronic low back pain    • CKD (chronic kidney disease)    • COPD (chronic obstructive pulmonary disease) ([facility])    • Coronary arteriosclerosis    • Diabetes mellitus ([facility])    • Gastroparesis    • GERD (gastroesophageal reflux disease)    • Hearing loss    • Hypertension    • Obesity    • Oxygen dependent        Past Surgical History:  Past Surgical History:   Procedure Laterality Date   • CORONARY ARTERY BYPASS GRAFT     • THORACOSCOPY  2019   • TONSILLECTOMY         Family History  History reviewed. No pertinent family history.    Social History  Social History     Socioeconomic History   • Marital status:    Tobacco Use   • Smoking status: Former Smoker   • Smokeless tobacco: Never Used         Review of Systems:  History obtained from chart review and the patient  General ROS: No fever or chills  Respiratory ROS: No cough, +shortness of breath  Cardiovascular ROS: No chest pain or palpitations  Gastrointestinal ROS: No abdominal pain or melena  Genito-Urinary ROS: No dysuria or hematuria  Psych ROS: No anxiety and depression  14 point ROS reviewed with the patient and negative except as noted above and in the HPI unless unable to obtain.      Objective:  Patient Vitals for the past 24 hrs:   BP Temp Temp src Pulse Resp SpO2 Height Weight   07/09/22 1830 (!) 137/114 -- -- 91 -- 95 % -- --   07/09/22 1800 134/96 -- -- 85 19 94 % -- --   07/09/22 1730 117/74 -- -- 81 -- 96 % -- --   07/09/22 1700 112/48 -- -- 77 17 93 % -- --   07/09/22 1655 112/48 -- -- 76 -- 94 % -- --   07/09/22 1630 (!) 84/40 -- -- 83 -- 94 % -- --   07/09/22 1615 118/61 -- -- 85 -- 94 % -- --   07/09/22 1600 117/67 98 °F (36.7 °C) Oral 90 19 92 % -- --   07/09/22  "1545 (!) 149/133 -- -- 90 -- 93 % -- --   07/09/22 1530 (!) 138/32 -- -- 91 -- 95 % -- --   07/09/22 1519 -- -- -- 91 18 98 % -- --   07/09/22 1515 123/50 -- -- 91 -- 95 % -- --   07/09/22 1512 -- -- -- 90 14 95 % -- --   07/09/22 1500 127/54 -- -- 91 19 95 % -- --   07/09/22 1445 129/53 -- -- 93 -- 96 % -- --   07/09/22 1430 133/50 -- -- 95 -- 96 % -- --   07/09/22 1345 130/42 -- -- 86 -- 96 % -- --   07/09/22 1330 111/42 -- -- 83 -- 96 % -- --   07/09/22 1315 119/45 -- -- 83 -- 96 % -- --   07/09/22 1300 110/41 -- -- 75 19 94 % -- --   07/09/22 1257 118/48 -- -- -- -- -- 162.6 cm (64\") 96.2 kg (212 lb)   07/09/22 1100 -- -- -- 84 -- 99 % -- --   07/09/22 1045 -- -- -- 83 -- 98 % -- --   07/09/22 1030 118/48 -- -- 83 -- 99 % -- --   07/09/22 1015 110/42 -- -- 86 -- 97 % -- --   07/09/22 1004 128/43 97.8 °F (36.6 °C) Oral 86 14 99 % 162.6 cm (64\") 96.4 kg (212 lb 8.4 oz)       Intake/Output Summary (Last 24 hours) at 7/9/2022 1906  Last data filed at 7/9/2022 1615  Gross per 24 hour   Intake 200 ml   Output 1125 ml   Net -925 ml     General: awake/alert , Chronically ill-appearing  Chest:  clear to auscultation bilaterally without respiratory distress  CVS: regular rate and rhythm  Abdominal: soft, nontender, positive bowel sounds  Extremities: ble edema  Skin: warm and dry without rash      Labs:  Results from last 7 days   Lab Units 07/09/22  1053 07/03/22  1136   WBC 10*3/mm3 15.63*  --    HEMOGLOBIN g/dL 8.2* 7.7*   HEMATOCRIT % 28.3* 26.7*   PLATELETS 10*3/mm3 252  --          Results from last 7 days   Lab Units 07/09/22  1406 07/09/22  1125   SODIUM mmol/L 132* 131*   POTASSIUM mmol/L 6.0* 6.8*   CHLORIDE mmol/L 98 99   CO2 mmol/L 12.0* 17.0*   BUN mg/dL 50* 50*   CREATININE mg/dL 2.94* 2.84*   CALCIUM mg/dL 8.3* 8.0*   EGFR mL/min/1.73 22.2* 23.1*   BILIRUBIN mg/dL  --  0.3   ALK PHOS U/L  --  122*   ALT (SGPT) U/L  --  37   AST (SGOT) U/L  --  18   GLUCOSE mg/dL 494*  494* 487*       Radiology:   Imaging " Results (Last 72 Hours)     Procedure Component Value Units Date/Time    US Venous Doppler Lower Extremity Bilateral (duplex) [[id number]] Resulted: 07/09/22 1421     Updated: 07/09/22 1510    XR Chest 1 View [[id number]] Collected: 07/09/22 1051     Updated: 07/09/22 1056    Narrative:      XR CHEST 1 VW- 7/9/2022 10:39 AM CDT     HISTORY: pneumonia     COMPARISON: None.     FINDINGS:      Bilateral interlobular septal thickening with subpleural Kerley B lines.  Wedge-shaped patchy infiltrate identified in the right upper lobe with  additional patchy infiltrate in both lower lobes. Blunted left  costophrenic angle reflecting trace effusion or perhaps scarring. Mild  cardiomegaly with central pulmonary vascular congestion. Prior coronary  bypass. Remote granulomatous calcification in the anil and mediastinum.  No acute bony abnormality.       Impression:      1. Interstitial edema present with bilateral peribronchial and  interlobular septal thickening. Multifocal patchy alveolar infiltrate is  well, and both lung bases as well as in the right upper lobe, which may  reflect either atypical pneumonia or areas of developing julio pulmonary  edema.        This report was finalized on 07/09/2022 10:53 by Dr Christian Szymanski, .          Culture:  No results found for: BLOODCX, URINECX, WOUNDCX, MRSACX, RESPCX, STOOLCX      Assessment   Acute kidney injury with unknown baseline  Chronic kidney disease by PMH-unknown stage  Hyperkalemia  Acute hypoxemic and hypercarbic respiratory failure  Hyponatremia  Metabolic acidosis  Diabetes type 2 with diabetic ketoacidosis  Pulmonary hypertension  Diastolic dysfunction      Plan:  Discussed with patient, nursing, hospitalist  Work-up ordered ongoing  Monitor labs  Will remove bicarbonate infusion given DKA and hypercarbic respiratory failure  Will decrease protocol rate of IV fluids given shortness of air complaints and continue to monitor in the ICU closely  Insulin infusion should  correct DKA  Awaiting entry of home medications for further review      Thank you for the consult, we appreciate the opportunity to provide care to your patients.  Feel free to contact me if I can be of any further assistance.      Anuj Nguyen MD  7/9/2022  19:06 CDT

## 2022-07-10 NOTE — PLAN OF CARE
Goal Outcome Evaluation:  Plan of Care Reviewed With: patient, family           Outcome Evaluation: Insulin gtts discontinuation criteria met this shift, refer to MAR for exact time.  Pt non-compliant with maintaining bipap machine throughout shift until approx 0615 this AM.  Remains in place at this time on previously programmed settings, maintaining SpO2 93-96%.  Pain medication administered x1, refer to MAR for exact time of administration, pt tolerated.  Weight shifting assistance provided, but pt pulls pillows out from under him when turned and desaturates with this activity.  Safety maintained, calm and cooperative with care, care plan updated.

## 2022-07-10 NOTE — PROGRESS NOTES
Nephrology ([facility]) Progress Note      Patient:  Isaias Cruz  YOB: 1950  Date of Service: 7/10/2022  MRN: [id number]   Acct: [id number]   Primary Care Physician: Mumtaz Teresa PA  Advance Directive:   Code Status and Medical Interventions:   Ordered at: 07/09/22 1241     Level Of Support Discussed With:    Patient     Code Status (Patient has no pulse and is not breathing):    CPR (Attempt to Resuscitate)     Medical Interventions (Patient has pulse or is breathing):    Full Support     Admit Date: 7/9/2022       Hospital Day: 1  Referring Provider: No Known Provider      Patient personally seen and examined.  Complete chart including Consults, Notes, Operative Reports, Labs, Cardiology, and Radiology studies reviewed as able.        Subjective:  Isaias Cruz is a 71 y.o. male for whom we were consulted for evaluation and treatment of acute kidney injury.  Patient admitted in transfer for evaluation of hyperglycemia with pneumonia.  No baseline labs available for comparison.  Patient has no prior nephrologic evaluations in our office records either.  Was placed on insulin drip for developing DKA.  Denied hematuria, dysuria, hemoptysis.  Other significant issues on admission included anemia, hyperkalemia, severe hypoalbuminemia.  Other complaints included nausea and vomiting prior to admission.  Case reviewed with nursing and primary in person.    Today, no overnight events.  Patient required BiPAP for respiratory support.  Anion gap is closed.  Events reviewed with nursing and hospitalist. +soa/edema/nausea    Allergies:  Ace inhibitors, Bactroban [mupirocin calcium], Bentyl [dicyclomine hcl], Codeine, Cymbalta [duloxetine hcl], Flagyl [metronidazole], Nifedipine, Nsaids, Penicillins, Reglan [metoclopramide hcl], Sulfa antibiotics, and Tetanus-diphth-acell pertussis    Home Meds:  Medications Prior to Admission   Medication Sig Dispense Refill Last Dose   • ALPRAZolam  (XANAX) 1 MG tablet Take 1 mg by mouth 3 (Three) Times a Day As Needed for Anxiety.      • aspirin 81 MG EC tablet Take 81 mg by mouth Daily.      • atorvastatin (LIPITOR) 40 MG tablet Take 40 mg by mouth Every Night.      • carvedilol (COREG) 25 MG tablet Take 25 mg by mouth 2 (Two) Times a Day With Meals.      • esomeprazole (nexIUM) 20 MG capsule Take 40 mg by mouth 2 (Two) Times a Day.      • ferrous gluconate (FERGON) 324 MG tablet Take 324 mg by mouth 3 (Three) Times a Day With Meals.      • furosemide (LASIX) 40 MG tablet Take 40 mg by mouth 2 (Two) Times a Day.      • hydrALAZINE (APRESOLINE) 100 MG tablet Take 100 mg by mouth Every 8 (Eight) Hours.      • Insulin Aspart (novoLOG) 100 UNIT/ML injection Inject 15 Units under the skin into the appropriate area as directed 3 (Three) Times a Day Before Meals.      • insulin detemir (LEVEMIR) 100 UNIT/ML injection Inject 50 Units under the skin into the appropriate area as directed Every Night.      • ipratropium-albuterol (DUO-NEB) 0.5-2.5 mg/3 ml nebulizer Take 3 mL by nebulization 4 (Four) Times a Day.      • melatonin 5 MG tablet tablet Take 20 mg by mouth Every Night.      • mirtazapine (REMERON) 15 MG tablet Take 45 mg by mouth Every Night.      • montelukast (SINGULAIR) 10 MG tablet Take 10 mg by mouth Every Night.      • Morphine (MS CONTIN) 15 MG 12 hr tablet Take 15 mg by mouth 3 (Three) Times a Day.      • ondansetron (Zofran) 4 MG tablet Take 4 mg by mouth Every 8 (Eight) Hours As Needed for Nausea or Vomiting.      • oxyCODONE (ROXICODONE) 5 MG immediate release tablet Take 5 mg by mouth Every 8 (Eight) Hours As Needed for Moderate Pain .      • predniSONE (DELTASONE) 5 MG tablet Take 5 mg by mouth Daily.      • simethicone (MYLICON) 80 MG chewable tablet Chew 80 mg 3 (Three) Times a Day As Needed for Flatulence.      • spironolactone (ALDACTONE) 50 MG tablet Take 50 mg by mouth Daily.      • triamcinolone (KENALOG) 0.1 % cream Apply 1 application  topically to the appropriate area as directed 2 (Two) Times a Day. Apply thin layer to the affected area 1-2 times a day      • Vortioxetine HBr (Trintellix) 10 MG tablet Take 10 mg by mouth Daily.          Medicines:  Current Facility-Administered Medications   Medication Dose Route Frequency Provider Last Rate Last Admin   • acetaminophen (TYLENOL) tablet 650 mg  650 mg Oral Q6H PRN Fabian Daigle MD       • ALPRAZolam (XANAX) tablet 1 mg  1 mg Oral TID PRN Fabian Daigle MD   1 mg at 07/09/22 1614   • aspirin EC tablet 81 mg  81 mg Oral Daily Fabian Daigle MD   81 mg at 07/10/22 1233   • atorvastatin (LIPITOR) tablet 40 mg  40 mg Oral Nightly Fabian Daigle MD       • carvedilol (COREG) tablet 25 mg  25 mg Oral BID With Meals Fabian Daigle MD       • cefTRIAXone (ROCEPHIN) 1 g in sodium chloride 0.9 % 100 mL IVPB-VTB  1 g Intravenous Q24H Fabian Daigle  mL/hr at 07/10/22 1234 1 g at 07/10/22 1234   • dextrose (D50W) (25 g/50 mL) IV injection 25 g  25 g Intravenous Q15 Min PRN Fabian Daigle MD       • dextrose (D50W) (25 g/50 mL) IV injection 25 g  25 g Intravenous Q15 Min PRN Fatuma Plascencia DO       • dextrose (GLUTOSE) oral gel 15 g  15 g Oral Q15 Min PRN Fabian Daigle MD       • dextrose (GLUTOSE) oral gel 15 g  15 g Oral Q15 Min PRN Fatuma Plascencia DO       • doxycycline (VIBRAMYCIN) 100 mg/100 mL 0.9% NS MBP  100 mg Intravenous Q12H Fabian Daigle MD   100 mg at 07/10/22 1524   • famotidine (PEPCID) injection 20 mg  20 mg Intravenous Daily Fabian Daigle MD   20 mg at 07/10/22 0835   • furosemide (LASIX) injection 40 mg  40 mg Intravenous Q12H Fabian Daigle MD   40 mg at 07/10/22 1638   • glucagon (human recombinant) (GLUCAGEN DIAGNOSTIC) injection 1 mg  1 mg Intramuscular Q15 Min Fabian Tejeda MD       • glucagon (human recombinant) (GLUCAGEN  DIAGNOSTIC) injection 1 mg  1 mg Intramuscular Q15 Min PRN Fatuma Plascencia DO       • insulin detemir (LEVEMIR) injection 50 Units  50 Units Subcutaneous Nightly Fabian Daigle MD       • Insulin Lispro (humaLOG) injection 0-14 Units  0-14 Units Subcutaneous TID AC Fabian Daigle MD   3 Units at 07/10/22 1637   • ipratropium-albuterol (DUO-NEB) nebulizer solution 3 mL  3 mL Nebulization 4x Daily - RT Fabian Daigle MD   3 mL at 07/10/22 1520   • ondansetron (ZOFRAN) injection 4 mg  4 mg Intravenous Q6H PRN Fabian Daigle MD   4 mg at 07/10/22 0050   • oxyCODONE (ROXICODONE) immediate release tablet 5 mg  5 mg Oral Q8H PRN Fabian Daigle MD   5 mg at 07/10/22 1234   • predniSONE (DELTASONE) tablet 5 mg  5 mg Oral Daily Fabian Daigle MD   5 mg at 07/10/22 1233   • simethicone (MYLICON) chewable tablet 80 mg  80 mg Oral TID PRN Fabian Daigle MD       • sodium chloride 0.9 % flush 10 mL  10 mL Intravenous Q12H Fabian Daigle MD   10 mL at 07/09/22 1302   • sodium chloride 0.9 % flush 10 mL  10 mL Intravenous PRN Fabian Daigle MD       • sodium chloride 0.9 % flush 10 mL  10 mL Intravenous PRN Fabian Daigle MD       • sodium zirconium cyclosilicate (LOKELMA) pack 10 g  10 g Oral TID Fabian Daigle MD   10 g at 07/10/22 1637       Past Medical History:  Past Medical History:   Diagnosis Date   • Chronic low back pain    • CKD (chronic kidney disease)    • COPD (chronic obstructive pulmonary disease) (HCC)    • Coronary arteriosclerosis    • Diabetes mellitus (HCC)    • Gastroparesis    • GERD (gastroesophageal reflux disease)    • Hearing loss    • Hypertension    • Obesity    • Oxygen dependent        Past Surgical History:  Past Surgical History:   Procedure Laterality Date   • CORONARY ARTERY BYPASS GRAFT     • THORACOSCOPY  2019   • TONSILLECTOMY         Family History  History reviewed. No  pertinent family history.    Social History  Social History     Socioeconomic History   • Marital status:    Tobacco Use   • Smoking status: Former Smoker   • Smokeless tobacco: Never Used       Review of Systems:  History obtained from chart review and the patient  General ROS: No fever or chills  Respiratory ROS: +cough, shortness of breath  Cardiovascular ROS: No chest pain or palpitations  Gastrointestinal ROS: No abdominal pain or melena  Genito-Urinary ROS: No dysuria or hematuria  Psych ROS: No anxiety and depression  14 point ROS reviewed with the patient and negative except as noted above and in the HPI unless unable to obtain.    Objective:  Patient Vitals for the past 24 hrs:   BP Temp Temp src Pulse Resp SpO2 Weight   07/10/22 1610 152/62 98.7 °F (37.1 °C) Oral 96 19 90 % --   07/10/22 1528 -- -- -- 81 -- -- --   07/10/22 1520 -- -- -- 81 16 98 % --   07/10/22 1500 144/51 -- -- 83 17 98 % --   07/10/22 1450 158/52 -- -- 88 -- 97 % --   07/10/22 1300 162/51 -- -- 98 -- 91 % --   07/10/22 1200 162/51 98.4 °F (36.9 °C) Oral 97 20 92 % --   07/10/22 1100 -- -- -- 91 -- 95 % --   07/10/22 1052 -- -- -- 91 -- -- --   07/10/22 1045 159/61 -- -- 90 -- 95 % --   07/10/22 1042 -- -- -- 90 23 94 % --   07/10/22 0900 171/58 -- -- 95 22 91 % --   07/10/22 0830 159/63 -- -- 91 -- 91 % --   07/10/22 0800 132/43 98.2 °F (36.8 °C) Oral 84 19 91 % --   07/10/22 0730 141/49 -- -- 85 -- 92 % --   07/10/22 0725 -- -- -- 87 -- -- --   07/10/22 0718 -- -- -- 88 17 (!) 89 % --   07/10/22 0700 121/44 -- -- 81 16 93 % --   07/10/22 0600 136/57 -- -- 95 -- 92 % --   07/10/22 0415 162/51 -- -- 98 -- 90 % --   07/10/22 0400 -- 98.7 °F (37.1 °C) Oral 95 -- 92 % 97.6 kg (215 lb 2.7 oz)   07/10/22 0300 151/53 -- -- 93 -- 94 % --   07/10/22 0200 156/84 -- -- 87 -- 96 % --   07/10/22 0100 151/80 -- -- 86 -- 96 % --   07/10/22 0000 126/42 98.5 °F (36.9 °C) Oral 87 -- 95 % --   07/09/22 2302 107/73 -- -- 87 23 92 % --   07/09/22  2100 136/53 -- -- 90 -- 96 % --   07/09/22 2000 122/91 98.8 °F (37.1 °C) Oral 92 -- 96 % --   07/09/22 1910 -- -- -- 94 20 92 % --   07/09/22 1830 (!) 137/114 -- -- 91 -- 95 % --   07/09/22 1800 134/96 -- -- 85 19 94 % --   07/09/22 1730 117/74 -- -- 81 -- 96 % --       Intake/Output Summary (Last 24 hours) at 7/10/2022 1713  Last data filed at 7/10/2022 1643  Gross per 24 hour   Intake 2007.58 ml   Output 1875 ml   Net 132.58 ml     General: awake/alert   Chest:  Decreased air entry bilaterally  CVS: regular rate and rhythm  Abdominal: soft, nontender, positive bowel sounds  Extremities: ble edema  Skin: warm and dry without rash      Labs:  Results from last 7 days   Lab Units 07/10/22  0443 07/09/22  1053   WBC 10*3/mm3 16.04* 15.63*   HEMOGLOBIN g/dL 8.3* 8.2*   HEMATOCRIT % 27.2* 28.3*   PLATELETS 10*3/mm3 296 252         Results from last 7 days   Lab Units 07/10/22  1133 07/10/22  0716 07/09/22  2344 07/09/22  1406 07/09/22  1125   SODIUM mmol/L 136 136 134*   < > 131*   POTASSIUM mmol/L 5.1 5.2 5.2   < > 6.8*   CHLORIDE mmol/L 100 102 99   < > 99   CO2 mmol/L 23.0 25.0 26.0   < > 17.0*   BUN mg/dL 42* 49* 46*   < > 50*   CREATININE mg/dL 2.03* 2.38* 2.43*   < > 2.84*   CALCIUM mg/dL 8.4* 8.5* 8.5*   < > 8.0*   EGFR mL/min/1.73 34.6* 28.6* 27.9*   < > 23.1*   BILIRUBIN mg/dL  --  <0.2  --   --  0.3   ALK PHOS U/L  --  108  --   --  122*   ALT (SGPT) U/L  --  29  --   --  37   AST (SGOT) U/L  --  15  --   --  18   GLUCOSE mg/dL 205* 202* 135*   < > 487*    < > = values in this interval not displayed.       Radiology:   Imaging Results (Last 72 Hours)     Procedure Component Value Units Date/Time    US Renal Bilateral [[id number]] Collected: 07/10/22 1334     Updated: 07/10/22 1338    Narrative:      US RENAL BILATERAL- 7/10/2022 11:28 AM CDT     HISTORY: rafa     COMPARISON: None       TECHNIQUE: Multiple longitudinal and transverse realtime sonographic  images of the kidneys and urinary bladder are obtained.       FINDINGS:      RIGHT KIDNEY: 10.4 x 6.1 x 6.0 cm. Diffuse cortical thinning. No solid  or cystic masses. The central echo complex is compact with no evidence  for hydronephrosis. No nephrolithiasis or abnormal perinephric fluid  collections . No hydroureter.      LEFT KIDNEY: 11.2 x 5.7 x 5.5 cm. Diffuse cortical thinning. No solid or  cystic masses. The central echo complex is compact with no evidence for  hydronephrosis. No nephrolithiasis or abnormal perinephric fluid  collections . No hydroureter.      PELVIS: Urinary bladder is completely decompressed by Zepeda catheter.       Impression:      1. Bilateral diffuse renal cortical thinning, appearance suggesting  chronic medical renal disease. No hydronephrosis.  2. Urinary bladder is completely decompressed by Zepeda catheter.        This report was finalized on 07/10/2022 13:35 by Dr Christian Szymanski, .    US Venous Doppler Lower Extremity Bilateral (duplex) [[id number]] Collected: 07/10/22 0915     Updated: 07/10/22 0918    Narrative:      History: Swelling       Impression:      Impression: There is no evidence of deep venous thrombosis or  superficial thrombophlebitis of right or left lower extremities.     Comments: Bilateral lower extremity venous duplex exam was performed  using color Doppler flow, Doppler waveform analysis, and grayscale  imaging, with and without compression. There is no evidence of deep  venous thrombosis in the common femoral, superficial femoral, popliteal,  peroneal, anterior tibial, and posterior tibial veins bilaterally. No  thrombus is identified in the saphenofemoral junctions and greater  saphenous veins bilaterally.         This report was finalized on 07/10/2022 09:15 by Dr. Antony Kay MD.    XR Chest 1 View [[id number]] Collected: 07/09/22 1051     Updated: 07/09/22 1056    Narrative:      XR CHEST 1 VW- 7/9/2022 10:39 AM CDT     HISTORY: pneumonia     COMPARISON: None.     FINDINGS:      Bilateral interlobular septal  thickening with subpleural Kerley B lines.  Wedge-shaped patchy infiltrate identified in the right upper lobe with  additional patchy infiltrate in both lower lobes. Blunted left  costophrenic angle reflecting trace effusion or perhaps scarring. Mild  cardiomegaly with central pulmonary vascular congestion. Prior coronary  bypass. Remote granulomatous calcification in the anil and mediastinum.  No acute bony abnormality.       Impression:      1. Interstitial edema present with bilateral peribronchial and  interlobular septal thickening. Multifocal patchy alveolar infiltrate is  well, and both lung bases as well as in the right upper lobe, which may  reflect either atypical pneumonia or areas of developing julio pulmonary  edema.        This report was finalized on 07/09/2022 10:53 by Dr Christian Szymanski, .          Culture:  Blood Culture   Date Value Ref Range Status   07/09/2022 No growth at 24 hours  Preliminary   07/09/2022 No growth at 24 hours  Preliminary         Assessment   Acute kidney injury with unknown baseline  Chronic kidney disease by PMH-unknown stage  Hyperkalemia  Acute hypoxemic and hypercarbic respiratory failure  Hyponatremia  Metabolic acidosis  Diabetes type 2 with diabetic ketoacidosis  Pulmonary hypertension  Diastolic dysfunction        Plan:  Discussed with patient, nursing, hospitalist  Work-up reviewed to date  Monitor labs  Insulin infusion has helped and now discontinued  Discontinue IV fluids  Continue IV diuretic planning for fluid overload  Reassess in the morning          Anuj Nguyen MD  7/10/2022  17:13 CDT

## 2022-07-10 NOTE — PROGRESS NOTES
1           [facility] Medicine Services  INPATIENT PROGRESS NOTE    Patient Name: Isaias Cruz  Date of Admission: 7/9/2022  Today's Date: 07/10/22  Length of Stay: 1  Primary Care Physician: Mumtaz Teresa PA    Subjective   Chief Complaint: Follow-up  HPI   Transferred here from outlying hospital for concern of pneumonia  Patient's main issue is increasing swelling and feeling sick for the past several days  Issues evolved upon admission.    Hyperkalemia  Severe hyperglycemia  Mixed acid-base disturbance  Anemia that required blood transfusion prior to this admission times transfusion with 1 pack RBC  Chronic hypoxic respiratory failure/on home oxygen  Acute kidney injury versus acute on chronic versus chronic kidney disease      Nephrology consulted (renal failure, mixed acid-base disturbance, fluid overload)  Glucomander infusion initiated last night    Started initially on Lasix, chest x-ray interstitial edema with bilateral peribronchial and interlobular septal thickening.  Echocardiogram showed pulmonary hypertension with normal EF.    Venous ultrasound ruled out presence of DVT.  SCD will be in place.    Other than pain, patient has no new complaint  Has not complained of any shortness of breath or difficulty breathing    Urine output is better per discussion with nurse  Used BiPAP only for an hour overnight  Blood pressure better    Review of Systems     All pertinent negatives and positives are as above. All other systems have been reviewed and are negative unless otherwise stated.     Objective    Temp:  [98 °F (36.7 °C)-98.8 °F (37.1 °C)] 98.2 °F (36.8 °C)  Heart Rate:  [75-98] 91  Resp:  [14-23] 23  BP: ()/() 159/61  Physical Exam    Appears chronically ill  Patient's face is very swollen  GEN: Awake, alert, interactive, in NAD  HEENT: Atraumatic, PERRLA, EOMI, Anicteric, Trachea midline, lateral posterior pharyngeal wall, no gross macroglossia  Short neck,  no stridor  Lungs: CTAB, no wheezing/rales/rhonchi  Heart: RRR, +S1/s2, no rub  ABD: soft, nt/nd, +BS, no guarding/rebound  Extremities: atraumatic, no cyanosis, positive pitting edema flank and other dependent portions including legs  Skin: Scaling/flaking of skin of lower extremities   Neuro: AAOx3, no focal deficits  Psych: normal mood & affect    Results Review:  I have reviewed the labs, radiology results, and diagnostic studies.    Laboratory Data:   Results from last 7 days   Lab Units 07/10/22  0443 07/09/22  1053 07/03/22  1136   WBC 10*3/mm3 16.04* 15.63*  --    HEMOGLOBIN g/dL 8.3* 8.2* 7.7*   HEMATOCRIT % 27.2* 28.3* 26.7*   PLATELETS 10*3/mm3 296 252  --         Results from last 7 days   Lab Units 07/10/22  0716 07/09/22  2344 07/09/22  1903 07/09/22  1406 07/09/22  1125   SODIUM mmol/L 136 134* 136   < > 131*   POTASSIUM mmol/L 5.2 5.2 6.0*   < > 6.8*   CHLORIDE mmol/L 102 99 99   < > 99   CO2 mmol/L 25.0 26.0 21.0*   < > 17.0*   BUN mg/dL 49* 46* 51*   < > 50*   CREATININE mg/dL 2.38* 2.43* 2.77*   < > 2.84*   CALCIUM mg/dL 8.5* 8.5* 8.4*   < > 8.0*   BILIRUBIN mg/dL <0.2  --   --   --  0.3   ALK PHOS U/L 108  --   --   --  122*   ALT (SGPT) U/L 29  --   --   --  37   AST (SGOT) U/L 15  --   --   --  18   GLUCOSE mg/dL 202* 135* 367*   < > 487*    < > = values in this interval not displayed.       Culture Data:   Blood Culture   Date Value Ref Range Status   07/09/2022 No growth at less than 24 hours  Preliminary   07/09/2022 No growth at less than 24 hours  Preliminary       Radiology Data:   Imaging Results (Last 24 Hours)     Procedure Component Value Units Date/Time    US Renal Bilateral [[id number]] Resulted: 07/10/22 0913     Updated: 07/10/22 0928    US Venous Doppler Lower Extremity Bilateral (duplex) [[id number]] Collected: 07/10/22 0915     Updated: 07/10/22 0918    Narrative:      History: Swelling       Impression:      Impression: There is no evidence of deep venous thrombosis  or  superficial thrombophlebitis of right or left lower extremities.     Comments: Bilateral lower extremity venous duplex exam was performed  using color Doppler flow, Doppler waveform analysis, and grayscale  imaging, with and without compression. There is no evidence of deep  venous thrombosis in the common femoral, superficial femoral, popliteal,  peroneal, anterior tibial, and posterior tibial veins bilaterally. No  thrombus is identified in the saphenofemoral junctions and greater  saphenous veins bilaterally.         This report was finalized on 07/10/2022 09:15 by Dr. nAtony Kay MD.          I have reviewed the patient's current medications.     Assessment/Plan     Active Hospital Problems    Diagnosis    • Fluid overload      Complex case  Problem list  · Hyperkalemia-resolved  · Significant fluid overload; hard to rule out pneumonia currently.  On empiric antibiotic  · Acute probably on chronic diastolic heart failure secondary to hypertension  · Pulmonary hypertension probably from underlying COPD, suspect underlying obstructive sleep apnea obesity hypoventilation syndrome  · Acute kidney injury -slowly improving  · Mixed acid-base disturbance  ·  hypertension  · Anemia with hemoglobin of 6.3 from transferring facility.  Patient received 1 unit of packed RBC from transferring facility.  Patient has an upcoming endoscopic exam and colonoscopy in outpatient setting.  Monitor for any active bleeding  · Hyperglycemia in a diabetic  · Morbid obesity  · Chronic O2 dependent/chronic respiratory failure hypoxic  · COPD not grossly in exacerbation; chronic low dose steroid  · Progressive generalized weakness likely all due to above medical comorbidities  · Polypharmacy  · Chronic pain syndrome     Plan:  Nephrology decided yesterday using half dosing/rate of fluid use for DKA.  Patient has anion acidosis which likely multifactorial -renal, possible DKA (hard to rule out now), hypercarbia    Continue diuretic.   Will defer to nephrology regarding fluids.  Monitor renal function fluid and electrolytes.    Started back on antihypertensive medication.       off Glucomander    Renal ultrasound currently in process bedside  Continuing empiric antibiotic  Continue DVT prophylaxis-SCD.  He shown possible bleed of unknown cause  Stress ulcer prophylaxis  LV diastolic dysfunction reported on echo    Results for orders placed during the hospital encounter of 07/09/22    Adult Transthoracic Echo Complete W/ Cont if Necessary Per Protocol    Interpretation Summary  · Left ventricular ejection fraction appears to be 66 - 70%. Left ventricular systolic function is normal.  · Left ventricular diastolic dysfunction is noted.  · The right ventricular cavity is moderately dilated.  · Mildly reduced right ventricular systolic function noted.  · Moderate pulmonary hypertension is present.      Will get culture report from outside hospital.  So far culture here showed no growth  Continuing current empiric antibiotic  BiPAP during sleep   ABG now  Will need need critical care unit today.    Discussed with nurse Zhao    aspirin, 81 mg, Oral, Daily  atorvastatin, 40 mg, Oral, Nightly  carvedilol, 25 mg, Oral, BID With Meals  cefTRIAXone, 1 g, Intravenous, Q24H  doxycycline, 100 mg, Intravenous, Q12H  famotidine, 20 mg, Intravenous, Daily  furosemide, 40 mg, Intravenous, Q12H  insulin detemir, 50 Units, Subcutaneous, Nightly  insulin lispro, 0-14 Units, Subcutaneous, TID AC  ipratropium-albuterol, 3 mL, Nebulization, 4x Daily - RT  predniSONE, 5 mg, Oral, Daily  sodium chloride, 10 mL, Intravenous, Q12H  sodium zirconium cyclosilicate, 10 g, Oral, TID      Critical care time greater than 30 minutes  Discharge Planning:tbd    Electronically signed by Fabian Daigle MD, 07/10/22, 11:25 CDT.

## 2022-07-11 ENCOUNTER — APPOINTMENT (OUTPATIENT)
Dept: GENERAL RADIOLOGY | Facility: HOSPITAL | Age: 72
End: 2022-07-11

## 2022-07-11 LAB
ALBUMIN SERPL-MCNC: 2.5 G/DL (ref 3.5–5.2)
ALBUMIN/GLOB SERPL: 0.7 G/DL
ALP SERPL-CCNC: 116 U/L (ref 39–117)
ALT SERPL W P-5'-P-CCNC: 24 U/L (ref 1–41)
ANION GAP SERPL CALCULATED.3IONS-SCNC: 7 MMOL/L (ref 5–15)
ARTERIAL PATENCY WRIST A: POSITIVE
AST SERPL-CCNC: 19 U/L (ref 1–40)
ATMOSPHERIC PRESS: 748 MMHG
BASE EXCESS BLDA CALC-SCNC: 6.4 MMOL/L (ref 0–2)
BASOPHILS # BLD AUTO: 0.04 10*3/MM3 (ref 0–0.2)
BASOPHILS NFR BLD AUTO: 0.3 % (ref 0–1.5)
BDY SITE: ABNORMAL
BILIRUB SERPL-MCNC: 0.2 MG/DL (ref 0–1.2)
BODY TEMPERATURE: 37 C
BUN SERPL-MCNC: 34 MG/DL (ref 8–23)
BUN/CREAT SERPL: 22.8 (ref 7–25)
CALCIUM SPEC-SCNC: 8.9 MG/DL (ref 8.6–10.5)
CHLORIDE SERPL-SCNC: 103 MMOL/L (ref 98–107)
CO2 SERPL-SCNC: 31 MMOL/L (ref 22–29)
CREAT SERPL-MCNC: 1.49 MG/DL (ref 0.76–1.27)
DEPRECATED RDW RBC AUTO: 56.7 FL (ref 37–54)
EGFRCR SERPLBLD CKD-EPI 2021: 50.2 ML/MIN/1.73
EOSINOPHIL # BLD AUTO: 0.22 10*3/MM3 (ref 0–0.4)
EOSINOPHIL NFR BLD AUTO: 1.7 % (ref 0.3–6.2)
EPAP: 6
ERYTHROCYTE [DISTWIDTH] IN BLOOD BY AUTOMATED COUNT: 16.9 % (ref 12.3–15.4)
GLOBULIN UR ELPH-MCNC: 3.4 GM/DL
GLUCOSE BLDC GLUCOMTR-MCNC: 100 MG/DL (ref 70–130)
GLUCOSE BLDC GLUCOMTR-MCNC: 150 MG/DL (ref 70–130)
GLUCOSE BLDC GLUCOMTR-MCNC: 168 MG/DL (ref 70–130)
GLUCOSE BLDC GLUCOMTR-MCNC: 191 MG/DL (ref 70–130)
GLUCOSE BLDC GLUCOMTR-MCNC: 193 MG/DL (ref 70–130)
GLUCOSE BLDC GLUCOMTR-MCNC: 67 MG/DL (ref 70–130)
GLUCOSE BLDC GLUCOMTR-MCNC: 95 MG/DL (ref 70–130)
GLUCOSE BLDC GLUCOMTR-MCNC: 96 MG/DL (ref 70–130)
GLUCOSE SERPL-MCNC: 80 MG/DL (ref 65–99)
HCO3 BLDA-SCNC: 32.3 MMOL/L (ref 20–26)
HCT VFR BLD AUTO: 26.6 % (ref 37.5–51)
HGB BLD-MCNC: 7.8 G/DL (ref 13–17.7)
IMM GRANULOCYTES # BLD AUTO: 0.23 10*3/MM3 (ref 0–0.05)
IMM GRANULOCYTES NFR BLD AUTO: 1.8 % (ref 0–0.5)
INHALED O2 CONCENTRATION: 50 %
IPAP: 16
LYMPHOCYTES # BLD AUTO: 1.62 10*3/MM3 (ref 0.7–3.1)
LYMPHOCYTES NFR BLD AUTO: 12.4 % (ref 19.6–45.3)
Lab: ABNORMAL
MCH RBC QN AUTO: 26.8 PG (ref 26.6–33)
MCHC RBC AUTO-ENTMCNC: 29.3 G/DL (ref 31.5–35.7)
MCV RBC AUTO: 91.4 FL (ref 79–97)
MODALITY: ABNORMAL
MONOCYTES # BLD AUTO: 1.03 10*3/MM3 (ref 0.1–0.9)
MONOCYTES NFR BLD AUTO: 7.9 % (ref 5–12)
NEUTROPHILS NFR BLD AUTO: 75.9 % (ref 42.7–76)
NEUTROPHILS NFR BLD AUTO: 9.91 10*3/MM3 (ref 1.7–7)
NRBC BLD AUTO-RTO: 0 /100 WBC (ref 0–0.2)
PCO2 BLDA: 53.8 MM HG (ref 35–45)
PCO2 TEMP ADJ BLD: 53.8 MM HG (ref 35–45)
PH BLDA: 7.39 PH UNITS (ref 7.35–7.45)
PH, TEMP CORRECTED: 7.39 PH UNITS (ref 7.35–7.45)
PLATELET # BLD AUTO: 314 10*3/MM3 (ref 140–450)
PMV BLD AUTO: 10.7 FL (ref 6–12)
PO2 BLDA: 66.1 MM HG (ref 83–108)
PO2 TEMP ADJ BLD: 66.1 MM HG (ref 83–108)
POTASSIUM SERPL-SCNC: 4.3 MMOL/L (ref 3.5–5.2)
PROT SERPL-MCNC: 5.9 G/DL (ref 6–8.5)
QT INTERVAL: 392 MS
QTC INTERVAL: 452 MS
RBC # BLD AUTO: 2.91 10*6/MM3 (ref 4.14–5.8)
SAO2 % BLDCOA: 93.3 % (ref 94–99)
SODIUM SERPL-SCNC: 141 MMOL/L (ref 136–145)
VENTILATOR MODE: ABNORMAL
WBC NRBC COR # BLD: 13.05 10*3/MM3 (ref 3.4–10.8)

## 2022-07-11 PROCEDURE — 94799 UNLISTED PULMONARY SVC/PX: CPT

## 2022-07-11 PROCEDURE — 82962 GLUCOSE BLOOD TEST: CPT

## 2022-07-11 PROCEDURE — 25010000002 CEFTRIAXONE PER 250 MG: Performed by: INTERNAL MEDICINE

## 2022-07-11 PROCEDURE — 82803 BLOOD GASES ANY COMBINATION: CPT

## 2022-07-11 PROCEDURE — 25010000002 ONDANSETRON PER 1 MG: Performed by: INTERNAL MEDICINE

## 2022-07-11 PROCEDURE — 25010000002 FUROSEMIDE PER 20 MG: Performed by: INTERNAL MEDICINE

## 2022-07-11 PROCEDURE — 63710000001 PREDNISONE PER 5 MG: Performed by: INTERNAL MEDICINE

## 2022-07-11 PROCEDURE — 85025 COMPLETE CBC W/AUTO DIFF WBC: CPT | Performed by: INTERNAL MEDICINE

## 2022-07-11 PROCEDURE — 94660 CPAP INITIATION&MGMT: CPT

## 2022-07-11 PROCEDURE — 63710000001 INSULIN DETEMIR PER 5 UNITS: Performed by: INTERNAL MEDICINE

## 2022-07-11 PROCEDURE — 97166 OT EVAL MOD COMPLEX 45 MIN: CPT | Performed by: OCCUPATIONAL THERAPIST

## 2022-07-11 PROCEDURE — 36600 WITHDRAWAL OF ARTERIAL BLOOD: CPT

## 2022-07-11 PROCEDURE — 71045 X-RAY EXAM CHEST 1 VIEW: CPT

## 2022-07-11 PROCEDURE — 80053 COMPREHEN METABOLIC PANEL: CPT | Performed by: INTERNAL MEDICINE

## 2022-07-11 RX ORDER — FLUTICASONE PROPIONATE 50 MCG
1 SPRAY, SUSPENSION (ML) NASAL DAILY
COMMUNITY

## 2022-07-11 RX ORDER — DOXYCYCLINE 100 MG/1
100 TABLET ORAL EVERY 12 HOURS
Status: COMPLETED | OUTPATIENT
Start: 2022-07-12 | End: 2022-07-16

## 2022-07-11 RX ORDER — CARBOXYMETHYLCELLULOSE SODIUM 5 MG/ML
1 SOLUTION/ DROPS OPHTHALMIC DAILY PRN
COMMUNITY

## 2022-07-11 RX ORDER — FAMOTIDINE 20 MG/1
20 TABLET, FILM COATED ORAL DAILY
Status: DISCONTINUED | OUTPATIENT
Start: 2022-07-12 | End: 2022-07-16 | Stop reason: HOSPADM

## 2022-07-11 RX ADMIN — ASPIRIN 81 MG: 81 TABLET ORAL at 08:38

## 2022-07-11 RX ADMIN — PREDNISONE 5 MG: 5 TABLET ORAL at 08:38

## 2022-07-11 RX ADMIN — IPRATROPIUM BROMIDE AND ALBUTEROL SULFATE 3 ML: 2.5; .5 SOLUTION RESPIRATORY (INHALATION) at 18:30

## 2022-07-11 RX ADMIN — SODIUM CHLORIDE 1 G: 9 INJECTION, SOLUTION INTRAVENOUS at 12:45

## 2022-07-11 RX ADMIN — OXYCODONE HYDROCHLORIDE 5 MG: 5 TABLET ORAL at 16:30

## 2022-07-11 RX ADMIN — CARVEDILOL 25 MG: 25 TABLET, FILM COATED ORAL at 17:06

## 2022-07-11 RX ADMIN — FUROSEMIDE 40 MG: 10 INJECTION, SOLUTION INTRAMUSCULAR; INTRAVENOUS at 05:15

## 2022-07-11 RX ADMIN — ACETAMINOPHEN 650 MG: 325 TABLET, FILM COATED ORAL at 21:47

## 2022-07-11 RX ADMIN — Medication 10 ML: at 05:15

## 2022-07-11 RX ADMIN — ATORVASTATIN CALCIUM 40 MG: 40 TABLET, FILM COATED ORAL at 21:47

## 2022-07-11 RX ADMIN — DEXTROSE MONOHYDRATE 25 G: 25 INJECTION, SOLUTION INTRAVENOUS at 05:11

## 2022-07-11 RX ADMIN — ONDANSETRON 4 MG: 2 INJECTION INTRAMUSCULAR; INTRAVENOUS at 15:32

## 2022-07-11 RX ADMIN — INSULIN DETEMIR 20 UNITS: 100 INJECTION, SOLUTION SUBCUTANEOUS at 21:48

## 2022-07-11 RX ADMIN — FUROSEMIDE 40 MG: 10 INJECTION, SOLUTION INTRAMUSCULAR; INTRAVENOUS at 17:06

## 2022-07-11 RX ADMIN — DOXYCYCLINE 100 MG: 100 INJECTION, POWDER, LYOPHILIZED, FOR SOLUTION INTRAVENOUS at 03:25

## 2022-07-11 RX ADMIN — Medication 10 ML: at 21:48

## 2022-07-11 RX ADMIN — IPRATROPIUM BROMIDE AND ALBUTEROL SULFATE 3 ML: 2.5; .5 SOLUTION RESPIRATORY (INHALATION) at 07:31

## 2022-07-11 RX ADMIN — FAMOTIDINE 20 MG: 10 INJECTION, SOLUTION INTRAVENOUS at 08:44

## 2022-07-11 RX ADMIN — Medication 10 ML: at 08:38

## 2022-07-11 RX ADMIN — DOXYCYCLINE 100 MG: 100 INJECTION, POWDER, LYOPHILIZED, FOR SOLUTION INTRAVENOUS at 14:14

## 2022-07-11 RX ADMIN — OXYCODONE HYDROCHLORIDE 5 MG: 5 TABLET ORAL at 08:44

## 2022-07-11 RX ADMIN — CARVEDILOL 25 MG: 25 TABLET, FILM COATED ORAL at 08:38

## 2022-07-11 RX ADMIN — IPRATROPIUM BROMIDE AND ALBUTEROL SULFATE 3 ML: 2.5; .5 SOLUTION RESPIRATORY (INHALATION) at 11:57

## 2022-07-11 NOTE — PROGRESS NOTES
[facility] Medicine Services  INPATIENT PROGRESS NOTE    Patient Name: Isaias Cruz  Date of Admission: 7/9/2022  Today's Date: 07/11/22  Length of Stay: 2  Primary Care Physician: Mumtaz Teresa PA    Subjective   Chief Complaint: follow up pneumonia  HPI   Doing ok.  Afebrile.  Breathing comfortably on Bipap.    Blood sugars have been dropping.          Review of Systems   Constitutional: Positive for fatigue. Negative for fever.   HENT: Negative for congestion and ear pain.    Eyes: Negative for redness and visual disturbance.   Respiratory: Positive for cough and shortness of breath. Negative for wheezing.    Cardiovascular: Negative for chest pain and palpitations.   Gastrointestinal: Negative for abdominal pain, diarrhea, nausea and vomiting.   Endocrine: Negative for cold intolerance and heat intolerance.   Genitourinary: Negative for dysuria and frequency.   Musculoskeletal: Negative for arthralgias and back pain.   Skin: Negative for rash and wound.   Neurological: Positive for weakness. Negative for dizziness and headaches.   Psychiatric/Behavioral: Negative for confusion. The patient is not nervous/anxious.           All pertinent negatives and positives are as above. All other systems have been reviewed and are negative unless otherwise stated.     Objective    Temp:  [98 °F (36.7 °C)-98.7 °F (37.1 °C)] 98.4 °F (36.9 °C)  Heart Rate:  [79-98] 81  Resp:  [16-23] 17  BP: (104-167)/(47-67) 165/52  Physical Exam  Vitals reviewed.   Constitutional:       Appearance: He is well-developed.   HENT:      Head: Normocephalic and atraumatic.      Right Ear: External ear normal.      Left Ear: External ear normal.      Nose: Nose normal.   Eyes:      General: No scleral icterus.        Right eye: No discharge.         Left eye: No discharge.      Conjunctiva/sclera: Conjunctivae normal.      Pupils: Pupils are equal, round, and reactive to light.   Neck:      Thyroid: No  thyromegaly.      Trachea: No tracheal deviation.   Cardiovascular:      Rate and Rhythm: Normal rate and regular rhythm.      Heart sounds: Normal heart sounds. No murmur heard.    No friction rub. No gallop.   Pulmonary:      Effort: Pulmonary effort is normal. No respiratory distress.      Breath sounds: No stridor. Decreased breath sounds and rhonchi present. No rales.   Chest:      Chest wall: No tenderness.   Abdominal:      General: Bowel sounds are normal. There is no distension.      Palpations: Abdomen is soft. There is no mass.      Tenderness: There is no abdominal tenderness. There is no guarding or rebound.      Hernia: No hernia is present.   Musculoskeletal:         General: No deformity. Normal range of motion.      Cervical back: Normal range of motion and neck supple.   Lymphadenopathy:      Cervical: No cervical adenopathy.   Skin:     General: Skin is warm and dry.      Coloration: Skin is not pale.      Findings: No erythema or rash.   Neurological:      Mental Status: He is alert and oriented to person, place, and time.      Cranial Nerves: No cranial nerve deficit.      Motor: No abnormal muscle tone.      Coordination: Coordination normal.      Deep Tendon Reflexes: Reflexes are normal and symmetric. Reflexes normal.   Psychiatric:         Behavior: Behavior normal.         Thought Content: Thought content normal.         Judgment: Judgment normal.             Results Review:  I have reviewed the labs, radiology results, and diagnostic studies.    Laboratory Data:   Results from last 7 days   Lab Units 07/11/22  0258 07/10/22  0443 07/09/22  1053   WBC 10*3/mm3 13.05* 16.04* 15.63*   HEMOGLOBIN g/dL 7.8* 8.3* 8.2*   HEMATOCRIT % 26.6* 27.2* 28.3*   PLATELETS 10*3/mm3 314 296 252        Results from last 7 days   Lab Units 07/11/22  0257 07/10/22  1133 07/10/22  0716 07/09/22  1406 07/09/22  1125   SODIUM mmol/L 141 136 136   < > 131*   POTASSIUM mmol/L 4.3 5.1 5.2   < > 6.8*   CHLORIDE  mmol/L 103 100 102   < > 99   CO2 mmol/L 31.0* 23.0 25.0   < > 17.0*   BUN mg/dL 34* 42* 49*   < > 50*   CREATININE mg/dL 1.49* 2.03* 2.38*   < > 2.84*   CALCIUM mg/dL 8.9 8.4* 8.5*   < > 8.0*   BILIRUBIN mg/dL 0.2  --  <0.2  --  0.3   ALK PHOS U/L 116  --  108  --  122*   ALT (SGPT) U/L 24  --  29  --  37   AST (SGOT) U/L 19  --  15  --  18   GLUCOSE mg/dL 80 205* 202*   < > 487*    < > = values in this interval not displayed.       Culture Data:   Blood Culture   Date Value Ref Range Status   07/09/2022 No growth at 24 hours  Preliminary   07/09/2022 No growth at 24 hours  Preliminary       Radiology Data:   Imaging Results (Last 24 Hours)     Procedure Component Value Units Date/Time    XR Chest 1 View [[id number]] Collected: 07/11/22 0712     Updated: 07/11/22 0718    Narrative:      EXAM/TECHNIQUE: XR CHEST 1 VW-     INDICATION: Follow up     COMPARISON: 7/9/2022     FINDINGS:     Cardiac silhouette is stable. Marked worsening of consolidation in the  RIGHT mid and upper lung zone. No definite change in patchy LEFT lower  lobe airspace opacity. No large pleural effusion. No visible  pneumothorax. Prior median sternotomy.       Impression:         Marked worsening of consolidation in the RIGHT mid and upper lung zone.  No significant change in LEFT lower lobe opacity.  This report was finalized on 07/11/2022 07:15 by Dr. Souleymane Fraga MD.    US Renal Bilateral [[id number]] Collected: 07/10/22 1334     Updated: 07/10/22 1338    Narrative:      US RENAL BILATERAL- 7/10/2022 11:28 AM CDT     HISTORY: rafa     COMPARISON: None       TECHNIQUE: Multiple longitudinal and transverse realtime sonographic  images of the kidneys and urinary bladder are obtained.      FINDINGS:      RIGHT KIDNEY: 10.4 x 6.1 x 6.0 cm. Diffuse cortical thinning. No solid  or cystic masses. The central echo complex is compact with no evidence  for hydronephrosis. No nephrolithiasis or abnormal perinephric fluid  collections . No hydroureter.       LEFT KIDNEY: 11.2 x 5.7 x 5.5 cm. Diffuse cortical thinning. No solid or  cystic masses. The central echo complex is compact with no evidence for  hydronephrosis. No nephrolithiasis or abnormal perinephric fluid  collections . No hydroureter.      PELVIS: Urinary bladder is completely decompressed by Zepeda catheter.       Impression:      1. Bilateral diffuse renal cortical thinning, appearance suggesting  chronic medical renal disease. No hydronephrosis.  2. Urinary bladder is completely decompressed by Zepeda catheter.        This report was finalized on 07/10/2022 13:35 by Dr Christian Szymanski, .          I have reviewed the patient's current medications.     Assessment/Plan     Active Hospital Problems    Diagnosis    • Fluid overload      1.  Pneumonia  -IV abx      2.  Acute pulmonary edema  -IV Diuretics    3.  CAD  -ASA  -Statin  -Coreg    4.  T2DM  -Levemir  -SSI    5.  GERD  -Pepcid    6.  HTN  -Coreg    7.  COPD  -PO steroids  -nebs    8.  LATRICE on CKD  -IV diuretics  -nephrology following    9.  Acute Hypoxic Respiratory failure  -IV diuretics  -IV abx          Discharge Planning: I expect the patient to be discharged to home in 3-4 days    Electronically signed by Anuj Cho MD, 07/11/22, 09:35 CDT.

## 2022-07-11 NOTE — CASE MANAGEMENT/SOCIAL WORK
Discharge Planning Assessment  [facility]     Patient Name: Isaias Cruz  MRN: [id number]  Today's Date: 7/11/2022    Admit Date: 7/9/2022     Discharge Needs Assessment     Row Name 07/11/22 1421       Living Environment    People in Home spouse    Name(s) of People in Home Deanna    Current Living Arrangements home    Primary Care Provided by spouse/significant other    Provides Primary Care For no one    Family Caregiver if Needed spouse    Family Caregiver Names Deanna    Quality of Family Relationships helpful;involved;supportive       Transition Planning    Patient/Family Anticipates Transition to home with family    Transportation Anticipated family or friend will provide       Discharge Needs Assessment    Readmission Within the Last 30 Days no previous admission in last 30 days    Equipment Currently Used at Home shower chair;walker, rolling;oxygen    Concerns to be Addressed no discharge needs identified    Equipment Needed After Discharge none    Discharge Coordination/Progress spoke to spouse, Deanna; has RX coverage oxygen 4LNC continuously with [facility] and PCP; spouse is caregiver for patient will follow for DC needs               Discharge Plan    No documentation.               Continued Care and Services - Admitted Since 7/9/2022    Coordination has not been started for this encounter.          Demographic Summary    No documentation.                Functional Status    No documentation.                Psychosocial    No documentation.                Abuse/Neglect    No documentation.                Legal    No documentation.                Substance Abuse    No documentation.                Patient Forms    No documentation.                   Sheila Garcia RN

## 2022-07-11 NOTE — PROGRESS NOTES
Pharmacy Dosing Service  Automatic IV to PO Conversion  Doxycycline    Assessment/Action/Plan:  Patient meets criteria listed below. Doxycycline 100 mg IV every 12 hours has been changed to Doxycycline 100 mg PO every 12 hours.     Subjective:  Isaias Cruz is a 71 y.o. male who meets the following criteria for IV to PO therapy conversion     Policy Criteria:  Tolerating oral fluids or 40ml/hour of enteral nutrition and oral route not otherwise compromised  Receiving other oral medications on a scheduled basis  Afebrile (temperature less than 100.4 degrees F) for at least 24 hours  WBC within/decreasing toward normal range    Additional Factors Considered:  Anti-emetic usage  Patient disposition per documentation  Disease states or conditions contraindicating oral conversion    Objective:  Lab Results   Component Value Date    WBC 13.05 (H) 07/11/2022     Temp Readings from Last 1 Encounters:   07/11/22 98.4 °F (36.9 °C) (Oral)     Diet Order   Procedures    Diet Regular; Cardiac, Consistent Carbohydrate       Active Medications    Current Facility-Administered Medications:     acetaminophen (TYLENOL) tablet 650 mg, 650 mg, Oral, Q6H PRN, Fabian Daigle MD, 650 mg at 07/10/22 1744    ALPRAZolam (XANAX) tablet 1 mg, 1 mg, Oral, TID PRN, Fabian Daigle MD, 1 mg at 07/09/22 1614    aspirin EC tablet 81 mg, 81 mg, Oral, Daily, Fabian Daigle MD, 81 mg at 07/11/22 0838    atorvastatin (LIPITOR) tablet 40 mg, 40 mg, Oral, Nightly, Fabian Daigle MD, 40 mg at 07/10/22 2048    carvedilol (COREG) tablet 25 mg, 25 mg, Oral, BID With Meals, Fabian Daigle MD, 25 mg at 07/11/22 0838    cefTRIAXone (ROCEPHIN) 1 g in sodium chloride 0.9 % 100 mL IVPB-VTB, 1 g, Intravenous, Q24H, Fabian Daigle MD, Last Rate: 200 mL/hr at 07/11/22 1245, 1 g at 07/11/22 1245    dextrose (D50W) (25 g/50 mL) IV injection 25 g, 25 g, Intravenous, Q15 Min PRN, Fabian Daigle  MD Saravanan, 25 g at 07/11/22 0511    dextrose (D50W) (25 g/50 mL) IV injection 25 g, 25 g, Intravenous, Q15 Min PRN, Fatuma Plascencia DO    dextrose (GLUTOSE) oral gel 15 g, 15 g, Oral, Q15 Min PRN, Fabian Daigle MD    dextrose (GLUTOSE) oral gel 15 g, 15 g, Oral, Q15 Min PRN, Fatuma Plascencia DO    [START ON 7/12/2022] doxycycline (ADOXA) tablet 100 mg, 100 mg, Oral, Q12H, Anuj Cho MD    [START ON 7/12/2022] famotidine (PEPCID) tablet 20 mg, 20 mg, Oral, Daily, Anuj Cho MD    furosemide (LASIX) injection 40 mg, 40 mg, Intravenous, Q12H, Fabian Daigle MD, 40 mg at 07/11/22 0515    glucagon (human recombinant) (GLUCAGEN DIAGNOSTIC) injection 1 mg, 1 mg, Intramuscular, Q15 Min PRN, Fabian Daigle MD    glucagon (human recombinant) (GLUCAGEN DIAGNOSTIC) injection 1 mg, 1 mg, Intramuscular, Q15 Min PRN, Fatuma Plascencia DO    insulin detemir (LEVEMIR) injection 20 Units, 20 Units, Subcutaneous, Nightly, Fatuma Plascencia DO    Insulin Lispro (humaLOG) injection 0-14 Units, 0-14 Units, Subcutaneous, TID AC, Fabian Daigle MD, 3 Units at 07/10/22 1637    ipratropium-albuterol (DUO-NEB) nebulizer solution 3 mL, 3 mL, Nebulization, 4x Daily - RT, Fabian Daigle MD, 3 mL at 07/11/22 1157    ondansetron (ZOFRAN) injection 4 mg, 4 mg, Intravenous, Q6H PRN, Fabian Daigle MD, 4 mg at 07/10/22 0050    oxyCODONE (ROXICODONE) immediate release tablet 5 mg, 5 mg, Oral, Q8H PRN, Fabian Daigle MD, 5 mg at 07/11/22 0844    predniSONE (DELTASONE) tablet 5 mg, 5 mg, Oral, Daily, Fabian Daigle MD, 5 mg at 07/11/22 0838    simethicone (MYLICON) chewable tablet 80 mg, 80 mg, Oral, TID PRN, Fabian Daigle MD    sodium chloride 0.9 % flush 10 mL, 10 mL, Intravenous, Q12H, Fabian Daigle MD, 10 mL at 07/11/22 0838    sodium chloride 0.9 % flush 10 mL, 10 mL, Intravenous, PRNPilo,  Fabian Coe MD    sodium chloride 0.9 % flush 10 mL, 10 mL, Intravenous, PRN, Fabian Daigle MD, 10 mL at 07/11/22 0515    Vince Coley, PharmD  07/11/2214:42 CDT

## 2022-07-11 NOTE — PROGRESS NOTES
Pharmacy Dosing Service  Automatic IV to PO Conversion  famotidine    Assessment/Action/Plan:  Patient meets criteria listed below. Famotidine 20 mg IV every 24 hours has been changed to Famotidine 20 mg PO every 24 hours.       Subjective:  Isaias Cruz is a 71 y.o. male who meets the following criteria for IV to PO therapy conversion     Policy Criteria:  Tolerating oral fluids or 40ml/hour of enteral nutrition and oral route not otherwise compromised  Receiving other oral medications on a scheduled basis    Additional Factors Considered:  Anti-emetic usage  Patient disposition per documentation  Disease states or conditions contraindicating oral conversion    Objective:  Diet Order   Procedures    Diet Regular; Cardiac, Consistent Carbohydrate       Active Medications    Current Facility-Administered Medications:     acetaminophen (TYLENOL) tablet 650 mg, 650 mg, Oral, Q6H PRN, Fabian Daigle MD, 650 mg at 07/10/22 1744    ALPRAZolam (XANAX) tablet 1 mg, 1 mg, Oral, TID PRN, Fabian Daigle MD, 1 mg at 07/09/22 1614    aspirin EC tablet 81 mg, 81 mg, Oral, Daily, Fabian Daigle MD, 81 mg at 07/11/22 0838    atorvastatin (LIPITOR) tablet 40 mg, 40 mg, Oral, Nightly, Fabian Daigle MD, 40 mg at 07/10/22 2048    carvedilol (COREG) tablet 25 mg, 25 mg, Oral, BID With Meals, Fabian aDigle MD, 25 mg at 07/11/22 0838    cefTRIAXone (ROCEPHIN) 1 g in sodium chloride 0.9 % 100 mL IVPB-VTB, 1 g, Intravenous, Q24H, Fabian Daigle MD, Last Rate: 200 mL/hr at 07/11/22 1245, 1 g at 07/11/22 1245    dextrose (D50W) (25 g/50 mL) IV injection 25 g, 25 g, Intravenous, Q15 Min PRN, Fabian Daigle MD, 25 g at 07/11/22 0511    dextrose (D50W) (25 g/50 mL) IV injection 25 g, 25 g, Intravenous, Q15 Min PRN, Fatuma Plascencia,     dextrose (GLUTOSE) oral gel 15 g, 15 g, Oral, Q15 Min PRN, Fabian Daigle MD    dextrose (GLUTOSE) oral gel 15 g,  15 g, Oral, Q15 Min PRN, Fatuma Plascencia DO    [START ON 7/12/2022] doxycycline (ADOXA) tablet 100 mg, 100 mg, Oral, Q12H, Anuj Cho MD    [START ON 7/12/2022] famotidine (PEPCID) tablet 20 mg, 20 mg, Oral, Daily, Anuj Cho MD    furosemide (LASIX) injection 40 mg, 40 mg, Intravenous, Q12H, Fabian Daigle MD, 40 mg at 07/11/22 0515    glucagon (human recombinant) (GLUCAGEN DIAGNOSTIC) injection 1 mg, 1 mg, Intramuscular, Q15 Min PRN, Fabian Daigle MD    glucagon (human recombinant) (GLUCAGEN DIAGNOSTIC) injection 1 mg, 1 mg, Intramuscular, Q15 Min PRN, Fatuma Plascencia DO    insulin detemir (LEVEMIR) injection 20 Units, 20 Units, Subcutaneous, Nightly, Fatuma Plascencia DO    Insulin Lispro (humaLOG) injection 0-14 Units, 0-14 Units, Subcutaneous, TID AC, Fabian Daigle MD, 3 Units at 07/10/22 1637    ipratropium-albuterol (DUO-NEB) nebulizer solution 3 mL, 3 mL, Nebulization, 4x Daily - RT, Fabian Daigle MD, 3 mL at 07/11/22 1157    ondansetron (ZOFRAN) injection 4 mg, 4 mg, Intravenous, Q6H PRN, Fabian Daigle MD, 4 mg at 07/10/22 0050    oxyCODONE (ROXICODONE) immediate release tablet 5 mg, 5 mg, Oral, Q8H PRN, Fabian Daigle MD, 5 mg at 07/11/22 0844    predniSONE (DELTASONE) tablet 5 mg, 5 mg, Oral, Daily, Fabian Daigle MD, 5 mg at 07/11/22 0838    simethicone (MYLICON) chewable tablet 80 mg, 80 mg, Oral, TID PRN, Fabian Daigle MD    sodium chloride 0.9 % flush 10 mL, 10 mL, Intravenous, Q12H, Fabian Daigle MD, 10 mL at 07/11/22 0838    sodium chloride 0.9 % flush 10 mL, 10 mL, Intravenous, PRN, Fabian Daigle MD    sodium chloride 0.9 % flush 10 mL, 10 mL, Intravenous, PRN, Fabian Daigle MD, 10 mL at 07/11/22 0515    Vince Coley, PharmD  07/11/2214:44 CDT

## 2022-07-11 NOTE — CONSULTS
20 gauge 2.5 inch USGPIV placed in right forearm with 1 attempt(s). 20 gauge 2.5 inch USGPIV placed in left forearm with 1 attempt(s).

## 2022-07-11 NOTE — PROGRESS NOTES
Nephrology ([facility]) Progress Note      Patient:  Isaias Cruz  YOB: 1950  Date of Service: 7/11/2022  MRN: [id number]   Acct: [id number]   Primary Care Physician: Mumtaz Teresa PA  Advance Directive:   Code Status and Medical Interventions:   Ordered at: 07/09/22 1241     Level Of Support Discussed With:    Patient     Code Status (Patient has no pulse and is not breathing):    CPR (Attempt to Resuscitate)     Medical Interventions (Patient has pulse or is breathing):    Full Support     Admit Date: 7/9/2022       Hospital Day: 2  Referring Provider: No Known Provider      Patient personally seen and examined.  Complete chart including Consults, Notes, Operative Reports, Labs, Cardiology, and Radiology studies reviewed as able.        Subjective:  Isaias Cruz is a 71 y.o. male for whom we were consulted for evaluation and treatment of acute kidney injury.  No prior known renal issues, but unknown baseline renal function. History of COPD. Type 2 diabetes, hypertension, GERD.  Transferred to [facility] from [facility]. Admitting diagnosis was sepsis due to pneumonia. Patient also evaluated for DKA.  Complaints includes nausea and vomiting.  Denied any urinary complaints. Initial labs at this facility showed potassium 6.8, creatinine 2.8. Started on insulin gtt and PO Lokelma. Hospital course remarkable for improving blood glucose level and correction of hyperkalemia.     Today is awake, alert. Remains short of breath. Edema improving. Urine output nonoliguric.    Allergies:  Ace inhibitors, Bactroban [mupirocin calcium], Bentyl [dicyclomine hcl], Codeine, Cymbalta [duloxetine hcl], Flagyl [metronidazole], Nifedipine, Nsaids, Penicillins, Reglan [metoclopramide hcl], Sulfa antibiotics, and Tetanus-diphth-acell pertussis    Home Meds:  Medications Prior to Admission   Medication Sig Dispense Refill Last Dose   • ALPRAZolam (XANAX) 1 MG tablet Take 1 mg  by mouth 3 (Three) Times a Day As Needed for Anxiety.      • aspirin 81 MG EC tablet Take 81 mg by mouth Daily.      • atorvastatin (LIPITOR) 40 MG tablet Take 40 mg by mouth Every Night.      • carvedilol (COREG) 25 MG tablet Take 25 mg by mouth 2 (Two) Times a Day With Meals.      • esomeprazole (nexIUM) 20 MG capsule Take 40 mg by mouth 2 (Two) Times a Day.      • ferrous gluconate (FERGON) 324 MG tablet Take 324 mg by mouth 3 (Three) Times a Day With Meals.      • furosemide (LASIX) 40 MG tablet Take 40 mg by mouth 2 (Two) Times a Day.      • hydrALAZINE (APRESOLINE) 100 MG tablet Take 100 mg by mouth Every 8 (Eight) Hours.      • Insulin Aspart (novoLOG) 100 UNIT/ML injection Inject 15 Units under the skin into the appropriate area as directed 3 (Three) Times a Day Before Meals.      • insulin detemir (LEVEMIR) 100 UNIT/ML injection Inject 50 Units under the skin into the appropriate area as directed Every Night.      • ipratropium-albuterol (DUO-NEB) 0.5-2.5 mg/3 ml nebulizer Take 3 mL by nebulization 4 (Four) Times a Day.      • melatonin 5 MG tablet tablet Take 20 mg by mouth Every Night.      • mirtazapine (REMERON) 15 MG tablet Take 45 mg by mouth Every Night.      • montelukast (SINGULAIR) 10 MG tablet Take 10 mg by mouth Every Night.      • Morphine (MS CONTIN) 15 MG 12 hr tablet Take 15 mg by mouth 3 (Three) Times a Day.      • ondansetron (Zofran) 4 MG tablet Take 4 mg by mouth Every 8 (Eight) Hours As Needed for Nausea or Vomiting.      • oxyCODONE (ROXICODONE) 5 MG immediate release tablet Take 5 mg by mouth Every 8 (Eight) Hours As Needed for Moderate Pain .      • predniSONE (DELTASONE) 5 MG tablet Take 5 mg by mouth Daily.      • simethicone (MYLICON) 80 MG chewable tablet Chew 80 mg 3 (Three) Times a Day As Needed for Flatulence.      • spironolactone (ALDACTONE) 50 MG tablet Take 50 mg by mouth Daily.      • triamcinolone (KENALOG) 0.1 % cream Apply 1 application topically to the appropriate  area as directed 2 (Two) Times a Day. Apply thin layer to the affected area 1-2 times a day      • Vortioxetine HBr (Trintellix) 10 MG tablet Take 10 mg by mouth Daily.          Medicines:  Current Facility-Administered Medications   Medication Dose Route Frequency Provider Last Rate Last Admin   • acetaminophen (TYLENOL) tablet 650 mg  650 mg Oral Q6H PRN Fabian Daigle MD   650 mg at 07/10/22 1744   • ALPRAZolam (XANAX) tablet 1 mg  1 mg Oral TID PRN Fabian Daigle MD   1 mg at 07/09/22 1614   • aspirin EC tablet 81 mg  81 mg Oral Daily Fabian Daigle MD   81 mg at 07/11/22 0838   • atorvastatin (LIPITOR) tablet 40 mg  40 mg Oral Nightly Fabian Daigle MD   40 mg at 07/10/22 2048   • carvedilol (COREG) tablet 25 mg  25 mg Oral BID With Meals Fabian Daigle MD   25 mg at 07/11/22 0838   • cefTRIAXone (ROCEPHIN) 1 g in sodium chloride 0.9 % 100 mL IVPB-VTB  1 g Intravenous Q24H Fabian Daigle  mL/hr at 07/10/22 1234 1 g at 07/10/22 1234   • dextrose (D50W) (25 g/50 mL) IV injection 25 g  25 g Intravenous Q15 Min PRN Fabian Daigle MD   25 g at 07/11/22 0511   • dextrose (D50W) (25 g/50 mL) IV injection 25 g  25 g Intravenous Q15 Min PRN Fatuma Plascencia DO       • dextrose (GLUTOSE) oral gel 15 g  15 g Oral Q15 Min PRN Fabian Daigle MD       • dextrose (GLUTOSE) oral gel 15 g  15 g Oral Q15 Min PRN Fatuma Plascencia DO       • doxycycline (VIBRAMYCIN) 100 mg/100 mL 0.9% NS MBP  100 mg Intravenous Q12H Fabian Daigle MD   100 mg at 07/11/22 0325   • famotidine (PEPCID) injection 20 mg  20 mg Intravenous Daily Fabian Daigle MD   20 mg at 07/11/22 0844   • furosemide (LASIX) injection 40 mg  40 mg Intravenous Q12H Fabian Daigle MD   40 mg at 07/11/22 0515   • glucagon (human recombinant) (GLUCAGEN DIAGNOSTIC) injection 1 mg  1 mg Intramuscular Q15 Min Fabian Tejeda MD        • glucagon (human recombinant) (GLUCAGEN DIAGNOSTIC) injection 1 mg  1 mg Intramuscular Q15 Min PRN Fatuma Plascencia DO       • insulin detemir (LEVEMIR) injection 20 Units  20 Units Subcutaneous Nightly Fatuma Plascencia DO       • Insulin Lispro (humaLOG) injection 0-14 Units  0-14 Units Subcutaneous TID AC Fabian Daigle MD   3 Units at 07/10/22 1637   • ipratropium-albuterol (DUO-NEB) nebulizer solution 3 mL  3 mL Nebulization 4x Daily - RT Fabian Daigle MD   3 mL at 07/11/22 0731   • ondansetron (ZOFRAN) injection 4 mg  4 mg Intravenous Q6H PRN Fabian Daigle MD   4 mg at 07/10/22 0050   • oxyCODONE (ROXICODONE) immediate release tablet 5 mg  5 mg Oral Q8H PRN Fabian Daigle MD   5 mg at 07/11/22 0844   • predniSONE (DELTASONE) tablet 5 mg  5 mg Oral Daily Fabian Daigle MD   5 mg at 07/11/22 0838   • simethicone (MYLICON) chewable tablet 80 mg  80 mg Oral TID PRN Fabian Daigle MD       • sodium chloride 0.9 % flush 10 mL  10 mL Intravenous Q12H Fabian Daigel MD   10 mL at 07/11/22 0838   • sodium chloride 0.9 % flush 10 mL  10 mL Intravenous PRN Fabian Daigle MD       • sodium chloride 0.9 % flush 10 mL  10 mL Intravenous PRN Fabian Daigle MD   10 mL at 07/11/22 0515   • sodium zirconium cyclosilicate (LOKELMA) pack 10 g  10 g Oral TID Fabian Daigle MD   10 g at 07/10/22 2048       Past Medical History:  Past Medical History:   Diagnosis Date   • Chronic low back pain    • CKD (chronic kidney disease)    • COPD (chronic obstructive pulmonary disease) (HCC)    • Coronary arteriosclerosis    • Diabetes mellitus (HCC)    • Gastroparesis    • GERD (gastroesophageal reflux disease)    • Hearing loss    • Hypertension    • Obesity    • Oxygen dependent        Past Surgical History:  Past Surgical History:   Procedure Laterality Date   • CORONARY ARTERY BYPASS GRAFT     • THORACOSCOPY  2019   •  TONSILLECTOMY         Family History  History reviewed. No pertinent family history.    Social History  Social History     Socioeconomic History   • Marital status:    Tobacco Use   • Smoking status: Former Smoker   • Smokeless tobacco: Never Used       Review of Systems:  History obtained from chart review and the patient  General ROS: No fever or chills  Respiratory ROS: positive for - shortness of breath  Cardiovascular ROS: positive for - dyspnea on exertion and edema  No chest pain or palpitations  Gastrointestinal ROS: No abdominal pain or melena  Genito-Urinary ROS: No dysuria or hematuria  Psych ROS: No anxiety and depression  14 point ROS reviewed with the patient and negative except as noted above and in the HPI unless unable to obtain.    Objective:  Patient Vitals for the past 24 hrs:   BP Temp Temp src Pulse Resp SpO2 Weight   07/11/22 0800 -- 98.4 °F (36.9 °C) Oral -- -- -- --   07/11/22 0731 -- -- -- 81 17 95 % --   07/11/22 0700 165/52 -- -- 79 -- 97 % --   07/11/22 0600 167/61 -- -- 85 -- 97 % --   07/11/22 0500 163/57 -- -- 86 -- 95 % --   07/11/22 0400 167/61 98 °F (36.7 °C) Axillary 81 -- 98 % 93.9 kg (207 lb 0.2 oz)   07/11/22 0300 -- -- -- 86 -- 95 % --   07/11/22 0100 148/67 -- -- 80 -- 98 % --   07/11/22 0000 145/56 98.2 °F (36.8 °C) Axillary 82 -- 98 % --   07/10/22 2200 166/60 -- -- 91 -- 96 % --   07/10/22 2100 -- -- -- 91 -- 92 % --   07/10/22 2000 118/47 98.7 °F (37.1 °C) Oral 86 -- 90 % --   07/10/22 1857 -- -- -- 85 18 100 % --   07/10/22 1852 -- -- -- 84 18 96 % --   07/10/22 1825 104/56 -- -- 88 21 93 % --   07/10/22 1700 143/51 -- -- 91 20 94 % --   07/10/22 1610 152/62 98.7 °F (37.1 °C) Oral 96 19 90 % --   07/10/22 1528 -- -- -- 81 -- -- --   07/10/22 1520 -- -- -- 81 16 98 % --   07/10/22 1500 144/51 -- -- 83 17 98 % --   07/10/22 1450 158/52 -- -- 88 -- 97 % --   07/10/22 1300 162/51 -- -- 98 -- 91 % --   07/10/22 1200 162/51 98.4 °F (36.9 °C) Oral 97 20 92 % --    07/10/22 1100 -- -- -- 91 -- 95 % --   07/10/22 1052 -- -- -- 91 -- -- --   07/10/22 1045 159/61 -- -- 90 -- 95 % --   07/10/22 1042 -- -- -- 90 23 94 % --   07/10/22 0900 171/58 -- -- 95 22 91 % --       Intake/Output Summary (Last 24 hours) at 7/11/2022 0855  Last data filed at 7/11/2022 0800  Gross per 24 hour   Intake 1494.99 ml   Output 4325 ml   Net -2830.01 ml     General: awake/alert   Chest:  clear to auscultation bilaterally without respiratory distress  CVS: regular rate and rhythm  Abdominal: soft, nontender, positive bowel sounds  Extremities: trace lower ext edema  Skin: warm and dry without rash  Neuro; no focal motor deficits    Labs:  Results from last 7 days   Lab Units 07/11/22  0258 07/10/22  0443 07/09/22  1053   WBC 10*3/mm3 13.05* 16.04* 15.63*   HEMOGLOBIN g/dL 7.8* 8.3* 8.2*   HEMATOCRIT % 26.6* 27.2* 28.3*   PLATELETS 10*3/mm3 314 296 252         Results from last 7 days   Lab Units 07/11/22  0257 07/10/22  1133 07/10/22  0716 07/09/22  1406 07/09/22  1125   SODIUM mmol/L 141 136 136   < > 131*   POTASSIUM mmol/L 4.3 5.1 5.2   < > 6.8*   CHLORIDE mmol/L 103 100 102   < > 99   CO2 mmol/L 31.0* 23.0 25.0   < > 17.0*   BUN mg/dL 34* 42* 49*   < > 50*   CREATININE mg/dL 1.49* 2.03* 2.38*   < > 2.84*   CALCIUM mg/dL 8.9 8.4* 8.5*   < > 8.0*   EGFR mL/min/1.73 50.2* 34.6* 28.6*   < > 23.1*   BILIRUBIN mg/dL 0.2  --  <0.2  --  0.3   ALK PHOS U/L 116  --  108  --  122*   ALT (SGPT) U/L 24  --  29  --  37   AST (SGOT) U/L 19  --  15  --  18   GLUCOSE mg/dL 80 205* 202*   < > 487*    < > = values in this interval not displayed.       Radiology:   Imaging Results (Last 72 Hours)     Procedure Component Value Units Date/Time    XR Chest 1 View [[id number]] Collected: 07/11/22 0712     Updated: 07/11/22 0718    Narrative:      EXAM/TECHNIQUE: XR CHEST 1 VW-     INDICATION: Follow up     COMPARISON: 7/9/2022     FINDINGS:     Cardiac silhouette is stable. Marked worsening of consolidation in the  RIGHT  mid and upper lung zone. No definite change in patchy LEFT lower  lobe airspace opacity. No large pleural effusion. No visible  pneumothorax. Prior median sternotomy.       Impression:         Marked worsening of consolidation in the RIGHT mid and upper lung zone.  No significant change in LEFT lower lobe opacity.  This report was finalized on 07/11/2022 07:15 by Dr. Souleymane Fraga MD.    US Renal Bilateral [[id number]] Collected: 07/10/22 1334     Updated: 07/10/22 1338    Narrative:      US RENAL BILATERAL- 7/10/2022 11:28 AM CDT     HISTORY: rafa     COMPARISON: None       TECHNIQUE: Multiple longitudinal and transverse realtime sonographic  images of the kidneys and urinary bladder are obtained.      FINDINGS:      RIGHT KIDNEY: 10.4 x 6.1 x 6.0 cm. Diffuse cortical thinning. No solid  or cystic masses. The central echo complex is compact with no evidence  for hydronephrosis. No nephrolithiasis or abnormal perinephric fluid  collections . No hydroureter.      LEFT KIDNEY: 11.2 x 5.7 x 5.5 cm. Diffuse cortical thinning. No solid or  cystic masses. The central echo complex is compact with no evidence for  hydronephrosis. No nephrolithiasis or abnormal perinephric fluid  collections . No hydroureter.      PELVIS: Urinary bladder is completely decompressed by Zepeda catheter.       Impression:      1. Bilateral diffuse renal cortical thinning, appearance suggesting  chronic medical renal disease. No hydronephrosis.  2. Urinary bladder is completely decompressed by Zepeda catheter.        This report was finalized on 07/10/2022 13:35 by Dr Christian Szymanski, .    US Venous Doppler Lower Extremity Bilateral (duplex) [[id number]] Collected: 07/10/22 0915     Updated: 07/10/22 0918    Narrative:      History: Swelling       Impression:      Impression: There is no evidence of deep venous thrombosis or  superficial thrombophlebitis of right or left lower extremities.     Comments: Bilateral lower extremity venous duplex exam was  performed  using color Doppler flow, Doppler waveform analysis, and grayscale  imaging, with and without compression. There is no evidence of deep  venous thrombosis in the common femoral, superficial femoral, popliteal,  peroneal, anterior tibial, and posterior tibial veins bilaterally. No  thrombus is identified in the saphenofemoral junctions and greater  saphenous veins bilaterally.         This report was finalized on 07/10/2022 09:15 by Dr. Antony Kay MD.    XR Chest 1 View [[id number]] Collected: 07/09/22 1051     Updated: 07/09/22 1056    Narrative:      XR CHEST 1 VW- 7/9/2022 10:39 AM CDT     HISTORY: pneumonia     COMPARISON: None.     FINDINGS:      Bilateral interlobular septal thickening with subpleural Kerley B lines.  Wedge-shaped patchy infiltrate identified in the right upper lobe with  additional patchy infiltrate in both lower lobes. Blunted left  costophrenic angle reflecting trace effusion or perhaps scarring. Mild  cardiomegaly with central pulmonary vascular congestion. Prior coronary  bypass. Remote granulomatous calcification in the anil and mediastinum.  No acute bony abnormality.       Impression:      1. Interstitial edema present with bilateral peribronchial and  interlobular septal thickening. Multifocal patchy alveolar infiltrate is  well, and both lung bases as well as in the right upper lobe, which may  reflect either atypical pneumonia or areas of developing julio pulmonary  edema.        This report was finalized on 07/09/2022 10:53 by Dr Christian Szymanski, .          Culture:  Blood Culture   Date Value Ref Range Status   07/09/2022 No growth at 24 hours  Preliminary   07/09/2022 No growth at 24 hours  Preliminary         Assessment   1.  Acute kidney injury  2.  Unknown baseline renal function  3.  Hyperkalemia--resolved  4.  Hyponatremia--improved  5.  Type 2 diabetes with diabetic ketoacidosis--improving  6.  Acute respiratory failure  7.  Pulmonary hypertension    Plan:  1.   Hold Lokelma  2.  Continue intermittent Lasix  3.  Renal function improving, continue to monitor labs      Rory Carrillo, APRN  7/11/2022  08:55 CDT

## 2022-07-11 NOTE — THERAPY EVALUATION
Patient Name: Isaias Cruz  : 1950    MRN: [id number]                              Today's Date: 2022       Admit Date: 2022    Visit Dx:     ICD-10-CM ICD-9-CM   1. Impaired mobility and ADLs  Z74.09 V49.89    Z78.9      Patient Active Problem List   Diagnosis   • Fluid overload     Past Medical History:   Diagnosis Date   • Chronic low back pain    • CKD (chronic kidney disease)    • COPD (chronic obstructive pulmonary disease) (HCC)    • Coronary arteriosclerosis    • Diabetes mellitus (HCC)    • Gastroparesis    • GERD (gastroesophageal reflux disease)    • Hearing loss    • Hypertension    • Obesity    • Oxygen dependent      Past Surgical History:   Procedure Laterality Date   • CORONARY ARTERY BYPASS GRAFT     • THORACOSCOPY  2019   • TONSILLECTOMY        General Information     Row Name 22 1315          OT Time and Intention    Document Type evaluation  Pt presents as t/f from [facility] with possible sepsis d/t pneumonia. Dx: Fluid overload, hyperkalemia. PMH: COPD, CKD, DM II, HTN  -JJ (r) AD (t) JJ (c)     Mode of Treatment occupational therapy  -JJ (r) AD (t) JJ (c)     Row Name 22 1315          General Information    Patient Profile Reviewed yes  -JJ (r) AD (t) JJ (c)     Prior Level of Function independent:;ADL's;home management;all household mobility  -JJ (r) AD (t) JJ (c)     Existing Precautions/Restrictions oxygen therapy device and L/min;fall  -JJ (r) AD (t) JJ (c)     Barriers to Rehab medically complex  -JJ (r) AD (t) JJ (c)     Row Name 22 1315          Occupational Profile    Environmental Supports and Barriers (Occupational Profile) tub shower; grab bars and shower stool  -JJ (r) AD (t) JJ (c)     Row Name 22 131          Living Environment    People in Home spouse  -JJ (r) AD (t) JJ (c)     Row Name 22 131          Home Main Entrance    Number of Stairs, Main Entrance two  -JJ (r) AD (t) JJ (c)     Stair Railings, Main Entrance railing on  right side (ascending)  -JJ (r) AD (t) JJ (c)     Row Name 07/11/22 1315          Stairs Within Home, Primary    Number of Stairs, Within Home, Primary none  -JJ (r) AD (t) JJ (c)     Row Name 07/11/22 1315          Cognition    Orientation Status (Cognition) oriented to;person;place;situation;disoriented to;time  june  -JJ (r) AD (t) JJ (c)     Row Name 07/11/22 1315          Safety Issues, Functional Mobility    Impairments Affecting Function (Mobility) balance;endurance/activity tolerance;shortness of breath;strength  -JJ (r) AD (t) JJ (c)           User Key  (r) = Recorded By, (t) = Taken By, (c) = Cosigned By    Initials Name Provider Type    Mariela Le, OTR/L, CSRS Occupational Therapist    Cynthia Hunter, OT Student OT Student                 Mobility/ADL's     Row Name 07/11/22 1315          Bed Mobility    Bed Mobility scooting/bridging;supine-sit;sit-supine  -JJ (r) AD (t) JJ (c)     Scooting/Bridging [place] (Bed Mobility) maximum assist (25% patient effort);2 person assist  -JJ (r) AD (t) JJ (c)     Supine-Sit [place] (Bed Mobility) moderate assist (50% patient effort);1 person assist  -JJ (r) AD (t) JJ (c)     Sit-Supine [place] (Bed Mobility) moderate assist (50% patient effort);1 person assist  -JJ (r) AD (t) JJ (c)     Assistive Device (Bed Mobility) head of bed elevated;bed rails;draw sheet  -JJ (r) AD (t) JJ (c)     Row Name 07/11/22 1315          Transfers    Comment, (Transfers) Unable to safely perform fxl transfers or mobility this date, d/t desat to 70s with bed mobility. Will continue to assess  -JJ (r) AD (t) JJ (c)     Sit-Stand [place] (Transfers) not tested  -JJ (r) AD (t) JJ (c)     Row Name 07/11/22 1315          Functional Mobility    Functional Mobility- Ind. Level not appropriate to assess  -JJ (r) AD (t) JSTEFANY (c)     Functional Mobility- Comment Unable to safely perform fxl transfers or mobility this date, d/t desat to 70s with bed mobility. Will  further assess  -JJ (r) AD (t) JJ (c)     Row Name 07/11/22 1315          Activities of Daily Living    BADL Assessment/Intervention lower body dressing  -JJ (r) AD (t) JJ (c)     Row Name 07/11/22 1315          Lower Body Dressing Assessment/Training    [place] Level (Lower Body Dressing) doff;don;socks;maximum assist (25% patient effort)  -JJ (r) AD (t) JJ (c)     Position (Lower Body Dressing) edge of bed sitting  -JJ (r) AD (t) JJ (c)     Comment, (Lower Body Dressing) Pt attempted however unablel to complete d/t SOA  -JJ (r) AD (t) JJ (c)           User Key  (r) = Recorded By, (t) = Taken By, (c) = Cosigned By    Initials Name Provider Type    Mariela Le, OTR/L, CSRS Occupational Therapist    Cynthia Hunter, OT Student OT Student               Obj/Interventions     Row Name 07/11/22 1315          Sensory Assessment (Somatosensory)    Sensory Assessment N/T in BLEs majority of time per pt report  -JJ (r) AD (t) JJ (c)     Row Name 07/11/22 1315          Range of Motion Comprehensive    General Range of Motion bilateral upper extremity ROM WFL  -JJ (r) AD (t) JJ (c)     Row Name 07/11/22 1315          Strength Comprehensive (MMT)    General Manual Muscle Testing (MMT) Assessment upper extremity strength deficits identified  -JJ (r) AD (t) JJ (c)     Comment, General Manual Muscle Testing (MMT) Assessment BUE grossly 4+/5  -JJ (r) AD (t) JJ (c)     Row Name 07/11/22 1315          Motor Skills    Motor Skills functional endurance  -JJ (r) AD (t) JJ (c)     Functional Endurance pt struggles to complete formal ROM and MMT d/t fatigue and SOA. Pt unable to complete don/doff socks d/t fatigue and SOA  -JJ (r) AD (t) JJ (c)     Row Name 07/11/22 1315          Balance    Balance Assessment sitting static balance;sitting dynamic balance  -JJ (r) AD (t) JJ (c)     Static Sitting Balance contact guard  -JJ (r) AD (t) JJ (c)     Dynamic Sitting Balance contact guard  -JJ (r) AD (t) JJ (c)     Position,  Sitting Balance unsupported;sitting edge of bed  -JJ (r) AD (t) JJ (c)           User Key  (r) = Recorded By, (t) = Taken By, (c) = Cosigned By    Initials Name Provider Type    Mariela Le, OTR/L, CSRS Occupational Therapist    Cynthia Hunter, OT Student OT Student               Goals/Plan     Row Name 07/11/22 1315          Bed Mobility Goal 1 (OT)    Activity/Assistive Device (Bed Mobility Goal 1, OT) bed mobility activities, all  -JJ (r) AD (t) JJ (c)     [place] Level/Cues Needed (Bed Mobility Goal 1, OT) minimum assist (75% or more patient effort)  -JJ (r) AD (t) JJ (c)     Time Frame (Bed Mobility Goal 1, OT) long term goal (LTG);by discharge  -JJ (r) AD (t) JJ (c)     Strategies/Barriers (Bed Mobility Goal 1, OT) with O2 sats >90%  -JJ (r) AD (t) JJ (c)     Progress/Outcomes (Bed Mobility Goal 1, OT) goal ongoing;new goal  -JJ (r) AD (t) JJ (c)     Row Name 07/11/22 1315          Transfer Goal 1 (OT)    Activity/Assistive Device (Transfer Goal 1, OT) toilet;commode, bedside without drop arms  -JJ (r) AD (t) JJ (c)     [place] Level/Cues Needed (Transfer Goal 1, OT) minimum assist (75% or more patient effort)  -JJ (r) AD (t) JJ (c)     Time Frame (Transfer Goal 1, OT) long term goal (LTG);by discharge  -JJ (r) AD (t) JJ (c)     Strategies/Barriers (Transfers Goal 1, OT) with O2 sats >90  -JJ (r) AD (t) JJ (c)     Progress/Outcome (Transfer Goal 1, OT) goal ongoing;new goal  -JJ (r) AD (t) JJ (c)     Row Name 07/11/22 1315          Dressing Goal 1 (OT)    Activity/Device (Dressing Goal 1, OT) dressing skills, all  -JJ (r) AD (t) JJ (c)     [place]/Cues Needed (Dressing Goal 1, OT) contact guard required;minimum assist (75% or more patient effort)  -JJ (r) AD (t) JJ (c)     Time Frame (Dressing Goal 1, OT) long term goal (LTG);by discharge  -JJ (r) AD (t) JJ (c)     Strategies/Barriers (Dressing Goal 1, OT) O2 sats >90%  -JJ (r) AD (t) JJ (c)     Progress/Outcome (Dressing Goal 1,  "OT) goal ongoing;new goal  -JJ (r) AD (t) JJ (c)     Row Name 07/11/22 1315          Toileting Goal 1 (OT)    Activity/Device (Toileting Goal 1, OT) toileting skills, all  -JJ (r) AD (t) JJ (c)     [place] Level/Cues Needed (Toileting Goal 1, OT) supervision required  -JJ (r) AD (t) JJ (c)     Time Frame (Toileting Goal 1, OT) long term goal (LTG);by discharge  -JJ (r) AD (t) JJ (c)     Progress/Outcome (Toileting Goal 1, OT) goal ongoing;new goal  -JJ (r) AD (t) JJ (c)     Row Name 07/11/22 1315          Therapy Assessment/Plan (OT)    Planned Therapy Interventions (OT) activity tolerance training;BADL retraining;functional balance retraining;occupation/activity based interventions;patient/caregiver education/training;strengthening exercise;transfer/mobility retraining  -JJ (r) AD (t) JJ (c)           User Key  (r) = Recorded By, (t) = Taken By, (c) = Cosigned By    Initials Name Provider Type    Mariela Le, OTR/L, CSRS Occupational Therapist    Cynthia Hunter, OT Student OT Student               Clinical Impression     Row Name 07/11/22 1315          Pain Assessment    Pretreatment Pain Rating 5/10  -JJ (r) AD (t) JJ (c)     Posttreatment Pain Rating 5/10  -JJ (r) AD (t) JJ (c)     Pain Location generalized  -JJ (r) AD (t) JJ (c)     Pain Location - back  -JJ (r) AD (t) JJ (c)     Pre/Posttreatment Pain Comment pt reports chronic back pain  -JJ (r) AD (t) JJ (c)     Row Name 07/11/22 1315          Plan of Care Review    Plan of Care Reviewed With patient  -JJ (r) AD (t) JJ (c)     Outcome Evaluation OT eval complete. Pt AOx3, disoriented to time. Pt was unsure of participation d/t \"not being able to breath,\" and was educated on the importance of movement and benefits of therapy. Pt agreeable, however very cautious. Pt reports baseline I in ADLs, mobility, and home managment, but states recently he has been in the hospital several times and hasnt been able to walk. Pt required mod A for " "sit<>supine, and max Ax2 for scooting. Pt desats to low 70s with bed mobility with noted shaking througout body. Pt educated and talked through PLB, with extensive RB sitting EOB, pt O2 sats returned to 90%. BUE ROM completed EOB and WFL, pt reported needing to lay down or \"im going to pass out\" so pt returned to supine with max A. MMT completed in supine with minimal BUE strenght deficits formally, however functionally, fatigue and SOA impact effectiveness of strength. Pt unable to don/doff socks sitting EOB, requiring max A. Unable to safely assess flx transfers and mobility this date d/t desats, and pt refusal, will continue to assess as safety concerns allow. Pt CGA with sitting balance with unsteadiness d/t reported dizziness. OT skilled services recommended at this time to address identified deficits. Recommended d/c short term stay at SNF for continued therapy pending pt progress.  -JJ (r) AD (t) JJ (c)     Row Name 07/11/22 1315          Therapy Assessment/Plan (OT)    Rehab Potential (OT) good, to achieve stated therapy goals  -JJ (r) AD (t) JJ (c)     Criteria for Skilled Therapeutic Interventions Met (OT) yes;skilled treatment is necessary  -JJ (r) AD (t) JJ (c)     Therapy Frequency (OT) 5 times/wk  -JJ (r) AD (t) JJ (c)     Predicted Duration of Therapy Intervention (OT) 10 days  -JJ (r) AD (t) JJ (c)     Row Name 07/11/22 1315          Therapy Plan Review/Discharge Plan (OT)    Anticipated Discharge Disposition (OT) skilled nursing facility  -JJ (r) AD (t) JJ (c)     Row Name 07/11/22 1315          Vital Signs    Pre SpO2 (%) 96  -JJ (r) AD (t) JJ (c)     O2 Delivery Pre Treatment nasal cannula  8L  -JJ (r) AD (t) JJ (c)     Intra SpO2 (%) 70  -JJ (r) AD (t) JJ (c)     O2 Delivery Intra Treatment nasal cannula  8L  -JJ (r) AD (t) JJ (c)     Post SpO2 (%) 93  -JJ (r) AD (t) JJ (c)     O2 Delivery Post Treatment nasal cannula  8L  -JJ (r) AD (t) JJ (c)     Pre Patient Position Supine  -JJ (r) AD (t) JJ " (c)     Intra Patient Position Sitting  -JJ (r) AD (t) JJ (c)     Post Patient Position Supine  -JJ (r) AD (t) JJ (c)     Row Name 07/11/22 1315          Positioning and Restraints    Pre-Treatment Position in bed  -JJ (r) AD (t) JJ (c)     Post Treatment Position bed  -JJ (r) AD (t) JJ (c)     In Bed fowlers;call light within reach;encouraged to call for assist;side rails up x3;patient within staff view  -JJ (r) AD (t) JJ (c)           User Key  (r) = Recorded By, (t) = Taken By, (c) = Cosigned By    Initials Name Provider Type    Mariela Le OTR/L, TODD Occupational Therapist    Cynthia Hunter, OT Student OT Student               Outcome Measures     Row Name 07/11/22 1315          How much help from another is currently needed...    Putting on and taking off regular lower body clothing? 2  -JJ (r) AD (t) JJ (c)     Bathing (including washing, rinsing, and drying) 2  -JJ (r) AD (t) JJ (c)     Toileting (which includes using toilet bed pan or urinal) 2  -JJ (r) AD (t) JJ (c)     Putting on and taking off regular upper body clothing 2  -JJ (r) AD (t) JJ (c)     Taking care of personal grooming (such as brushing teeth) 3  -JJ (r) AD (t) JJ (c)     Eating meals 4  -JJ (r) AD (t) JJ (c)     AM-PAC 6 Clicks Score (OT) 15  -JJ (r) AD (t)     Row Name 07/11/22 1315          Functional Assessment    Outcome Measure Options AM-PAC 6 Clicks Daily Activity (OT)  -JJ (r) AD (t) JJ (c)           User Key  (r) = Recorded By, (t) = Taken By, (c) = Cosigned By    Initials Name Provider Type    Mariela Le OTR/L, TODD Occupational Therapist    Cynthia Hunter, OT Student OT Student                Occupational Therapy Education                 Title: PT OT SLP Therapies (In Progress)     Topic: Occupational Therapy (In Progress)     Point: ADL training (Done)     Description:   Instruct learner(s) on proper safety adaptation and remediation techniques during self care or transfers.   Instruct in proper use  "of assistive devices.              Learning Progress Summary           Patient Acceptance, E, VU by AD at 7/11/2022 1416    Comment: benefits, role of OT, PLB                   Point: Home exercise program (Not Started)     Description:   Instruct learner(s) on appropriate technique for monitoring, assisting and/or progressing therapeutic exercises/activities.              Learner Progress:  Not documented in this visit.          Point: Precautions (Done)     Description:   Instruct learner(s) on prescribed precautions during self-care and functional transfers.              Learning Progress Summary           Patient Acceptance, E, VU by AD at 7/11/2022 1416    Comment: benefits, role of OT, PLB                   Point: Body mechanics (Not Started)     Description:   Instruct learner(s) on proper positioning and spine alignment during self-care, functional mobility activities and/or exercises.              Learner Progress:  Not documented in this visit.                      User Key     Initials Effective Dates Name Provider Type Discipline    AD 05/04/22 -  Cynthia Cabrera, OT Student OT Student OT              OT Recommendation and Plan  Planned Therapy Interventions (OT): activity tolerance training, BADL retraining, functional balance retraining, occupation/activity based interventions, patient/caregiver education/training, strengthening exercise, transfer/mobility retraining  Therapy Frequency (OT): 5 times/wk  Plan of Care Review  Plan of Care Reviewed With: patient  Outcome Evaluation: OT eval complete. Pt AOx3, disoriented to time. Pt was unsure of participation d/t \"not being able to breath,\" and was educated on the importance of movement and benefits of therapy. Pt agreeable, however very cautious. Pt reports baseline I in ADLs, mobility, and home managment, but states recently he has been in the hospital several times and hasnt been able to walk. Pt required mod A for sit<>supine, and max Ax2 for " "scooting. Pt desats to low 70s with bed mobility with noted shaking througout body. Pt educated and talked through PLB, with extensive RB sitting EOB, pt O2 sats returned to 90%. BUE ROM completed EOB and WFL, pt reported needing to lay down or \"im going to pass out\" so pt returned to supine with max A. MMT completed in supine with minimal BUE strenght deficits formally, however functionally, fatigue and SOA impact effectiveness of strength. Pt unable to don/doff socks sitting EOB, requiring max A. Unable to safely assess flx transfers and mobility this date d/t desats, and pt refusal, will continue to assess as safety concerns allow. Pt CGA with sitting balance with unsteadiness d/t reported dizziness. OT skilled services recommended at this time to address identified deficits. Recommended d/c short term stay at SNF for continued therapy pending pt progress.     Time Calculation:    Time Calculation- OT     Row Name 07/11/22 1315             Time Calculation- OT    OT Start Time 1315  +15 min chart review and discuss with nsg  -JSTEFANY (r) AD (t) JJ (c)      OT Stop Time 1354  -JJ (r) AD (t) JJ (c)      OT Time Calculation (min) 39 min  -JJ (r) AD (t)      Total Timed Code Minutes- OT 54 minute(s)  -JJ (r) AD (t) JJ (c)      OT Received On 07/11/22  -JJ (r) AD (t) JJ (c)      OT Goal Re-Cert Due Date 07/21/22  -JJ (r) AD (t) JJ (c)            User Key  (r) = Recorded By, (t) = Taken By, (c) = Cosigned By    Initials Name Provider Type    Mariela Le OTR/L, CSRS Occupational Therapist    Cynthia Hunter, OT Student OT Student                       Cynthia Cabrera OT Student  7/11/2022  "

## 2022-07-11 NOTE — PLAN OF CARE
"Goal Outcome Evaluation:  Plan of Care Reviewed With: patient           Outcome Evaluation: OT eusebio complete. Pt AOx3, disoriented to time. Pt was unsure of participation d/t \"not being able to breath,\" and was educated on the importance of movement and benefits of therapy. Pt agreeable, however very cautious. Pt reports baseline I in ADLs, mobility, and home managment, but states recently he has been in the hospital several times and hasnt been able to walk. Pt required mod A for sit<>supine, and max Ax2 for scooting. Pt desats to low 70s with bed mobility with noted shaking througout body. Pt educated and talked through PLB, with extensive RB sitting EOB, pt O2 sats returned to 90%. BUE ROM completed EOB and WFL, pt reported needing to lay down or \"im going to pass out\" so pt returned to supine with max A. MMT completed in supine with minimal BUE strenght deficits formally, however functionally, fatigue and SOA impact effectiveness of strength. Pt unable to don/doff socks sitting EOB, requiring max A. Unable to safely assess flx transfers and mobility this date d/t desats, and pt refusal, will continue to assess as safety concerns allow. Pt CGA with sitting balance with unsteadiness d/t reported dizziness. OT skilled services recommended at this time to address identified deficits. Recommended d/c short term stay at SNF for continued therapy pending pt progress.  "

## 2022-07-11 NOTE — PLAN OF CARE
Goal Outcome Evaluation:  Plan of Care Reviewed With: patient, other (see comments)        Progress: no change  Outcome Evaluation: Initial nutrition assessment. Pt reports he has not had any appetite for the last couple weeks. He also reports intermittent nausea. He is ordered a cardiac, C.CHO diet. No po intake recorded in EPIC at this time. RN reports she has been unable to get him to eat anything. He drank a couple sips of milk at lunch. He is willing to try Vanilla Boost Gluc control BID. He has been advised of alternate food selections and po intake encouraged. Will cont to follow.

## 2022-07-12 LAB
ALBUMIN SERPL-MCNC: 2.4 G/DL (ref 3.5–5.2)
ALBUMIN/GLOB SERPL: 0.7 G/DL
ALP SERPL-CCNC: 113 U/L (ref 39–117)
ALT SERPL W P-5'-P-CCNC: 19 U/L (ref 1–41)
ANION GAP SERPL CALCULATED.3IONS-SCNC: 6 MMOL/L (ref 5–15)
AST SERPL-CCNC: 19 U/L (ref 1–40)
BILIRUB SERPL-MCNC: 0.2 MG/DL (ref 0–1.2)
BUN SERPL-MCNC: 26 MG/DL (ref 8–23)
BUN/CREAT SERPL: 22.2 (ref 7–25)
CALCIUM SPEC-SCNC: 9.1 MG/DL (ref 8.6–10.5)
CHLORIDE SERPL-SCNC: 100 MMOL/L (ref 98–107)
CO2 SERPL-SCNC: 36 MMOL/L (ref 22–29)
CREAT SERPL-MCNC: 1.17 MG/DL (ref 0.76–1.27)
EGFRCR SERPLBLD CKD-EPI 2021: 67.1 ML/MIN/1.73
GLOBULIN UR ELPH-MCNC: 3.5 GM/DL
GLUCOSE BLDC GLUCOMTR-MCNC: 163 MG/DL (ref 70–130)
GLUCOSE BLDC GLUCOMTR-MCNC: 201 MG/DL (ref 70–130)
GLUCOSE BLDC GLUCOMTR-MCNC: 218 MG/DL (ref 70–130)
GLUCOSE BLDC GLUCOMTR-MCNC: 245 MG/DL (ref 70–130)
GLUCOSE BLDC GLUCOMTR-MCNC: 255 MG/DL (ref 70–130)
GLUCOSE BLDC GLUCOMTR-MCNC: 97 MG/DL (ref 70–130)
GLUCOSE SERPL-MCNC: 155 MG/DL (ref 65–99)
POTASSIUM SERPL-SCNC: 3.6 MMOL/L (ref 3.5–5.2)
PROT SERPL-MCNC: 5.9 G/DL (ref 6–8.5)
SODIUM SERPL-SCNC: 142 MMOL/L (ref 136–145)

## 2022-07-12 PROCEDURE — 25010000002 ONDANSETRON PER 1 MG: Performed by: INTERNAL MEDICINE

## 2022-07-12 PROCEDURE — 94799 UNLISTED PULMONARY SVC/PX: CPT

## 2022-07-12 PROCEDURE — 94761 N-INVAS EAR/PLS OXIMETRY MLT: CPT

## 2022-07-12 PROCEDURE — 63710000001 INSULIN DETEMIR PER 5 UNITS: Performed by: INTERNAL MEDICINE

## 2022-07-12 PROCEDURE — 25010000002 CEFTRIAXONE PER 250 MG: Performed by: INTERNAL MEDICINE

## 2022-07-12 PROCEDURE — 63710000001 PREDNISONE PER 5 MG: Performed by: INTERNAL MEDICINE

## 2022-07-12 PROCEDURE — 97110 THERAPEUTIC EXERCISES: CPT | Performed by: OCCUPATIONAL THERAPIST

## 2022-07-12 PROCEDURE — 97535 SELF CARE MNGMENT TRAINING: CPT | Performed by: OCCUPATIONAL THERAPIST

## 2022-07-12 PROCEDURE — 94664 DEMO&/EVAL PT USE INHALER: CPT

## 2022-07-12 PROCEDURE — 82962 GLUCOSE BLOOD TEST: CPT

## 2022-07-12 PROCEDURE — 80053 COMPREHEN METABOLIC PANEL: CPT | Performed by: INTERNAL MEDICINE

## 2022-07-12 PROCEDURE — 63710000001 INSULIN LISPRO (HUMAN) PER 5 UNITS: Performed by: INTERNAL MEDICINE

## 2022-07-12 PROCEDURE — 94660 CPAP INITIATION&MGMT: CPT

## 2022-07-12 PROCEDURE — 25010000002 FUROSEMIDE PER 20 MG: Performed by: INTERNAL MEDICINE

## 2022-07-12 RX ORDER — FUROSEMIDE 40 MG/1
40 TABLET ORAL
Status: DISCONTINUED | OUTPATIENT
Start: 2022-07-12 | End: 2022-07-12

## 2022-07-12 RX ORDER — FUROSEMIDE 40 MG/1
40 TABLET ORAL DAILY
Status: DISCONTINUED | OUTPATIENT
Start: 2022-07-13 | End: 2022-07-16 | Stop reason: HOSPADM

## 2022-07-12 RX ADMIN — INSULIN LISPRO 5 UNITS: 100 INJECTION, SOLUTION INTRAVENOUS; SUBCUTANEOUS at 11:38

## 2022-07-12 RX ADMIN — ASPIRIN 81 MG: 81 TABLET ORAL at 08:03

## 2022-07-12 RX ADMIN — DOXYCYCLINE 100 MG: 100 TABLET, FILM COATED ORAL at 08:03

## 2022-07-12 RX ADMIN — OXYCODONE HYDROCHLORIDE 5 MG: 5 TABLET ORAL at 09:01

## 2022-07-12 RX ADMIN — Medication 10 ML: at 20:29

## 2022-07-12 RX ADMIN — CARVEDILOL 25 MG: 25 TABLET, FILM COATED ORAL at 08:03

## 2022-07-12 RX ADMIN — Medication 10 ML: at 08:03

## 2022-07-12 RX ADMIN — ATORVASTATIN CALCIUM 40 MG: 40 TABLET, FILM COATED ORAL at 20:00

## 2022-07-12 RX ADMIN — SODIUM CHLORIDE 1 G: 9 INJECTION, SOLUTION INTRAVENOUS at 14:26

## 2022-07-12 RX ADMIN — DOXYCYCLINE 100 MG: 100 TABLET, FILM COATED ORAL at 17:02

## 2022-07-12 RX ADMIN — CARVEDILOL 25 MG: 25 TABLET, FILM COATED ORAL at 17:02

## 2022-07-12 RX ADMIN — INSULIN LISPRO 5 UNITS: 100 INJECTION, SOLUTION INTRAVENOUS; SUBCUTANEOUS at 16:34

## 2022-07-12 RX ADMIN — IPRATROPIUM BROMIDE AND ALBUTEROL SULFATE 3 ML: 2.5; .5 SOLUTION RESPIRATORY (INHALATION) at 06:23

## 2022-07-12 RX ADMIN — PREDNISONE 5 MG: 5 TABLET ORAL at 08:04

## 2022-07-12 RX ADMIN — IPRATROPIUM BROMIDE AND ALBUTEROL SULFATE 3 ML: 2.5; .5 SOLUTION RESPIRATORY (INHALATION) at 19:47

## 2022-07-12 RX ADMIN — FUROSEMIDE 40 MG: 40 TABLET ORAL at 17:03

## 2022-07-12 RX ADMIN — OXYCODONE HYDROCHLORIDE 5 MG: 5 TABLET ORAL at 17:02

## 2022-07-12 RX ADMIN — FUROSEMIDE 40 MG: 10 INJECTION, SOLUTION INTRAMUSCULAR; INTRAVENOUS at 06:17

## 2022-07-12 RX ADMIN — OXYCODONE HYDROCHLORIDE 5 MG: 5 TABLET ORAL at 01:01

## 2022-07-12 RX ADMIN — INSULIN DETEMIR 20 UNITS: 100 INJECTION, SOLUTION SUBCUTANEOUS at 20:00

## 2022-07-12 RX ADMIN — ONDANSETRON 4 MG: 2 INJECTION INTRAMUSCULAR; INTRAVENOUS at 16:18

## 2022-07-12 RX ADMIN — IPRATROPIUM BROMIDE AND ALBUTEROL SULFATE 3 ML: 2.5; .5 SOLUTION RESPIRATORY (INHALATION) at 10:14

## 2022-07-12 RX ADMIN — FAMOTIDINE 20 MG: 20 TABLET, FILM COATED ORAL at 08:03

## 2022-07-12 NOTE — PROGRESS NOTES
Nephrology ([facility]) Progress Note      Patient:  Isaias Cruz  YOB: 1950  Date of Service: 7/12/2022  MRN: [id number]   Acct: [id number]   Primary Care Physician: Mumtaz Teresa PA  Advance Directive:   Code Status and Medical Interventions:   Ordered at: 07/09/22 1241     Level Of Support Discussed With:    Patient     Code Status (Patient has no pulse and is not breathing):    CPR (Attempt to Resuscitate)     Medical Interventions (Patient has pulse or is breathing):    Full Support     Admit Date: 7/9/2022       Hospital Day: 3  Referring Provider: No Known Provider      Patient personally seen and examined.  Complete chart including Consults, Notes, Operative Reports, Labs, Cardiology, and Radiology studies reviewed as able.        Subjective:  Isaias Cruz is a 71 y.o. male for whom we were consulted for evaluation and treatment of acute kidney injury.  No prior known renal issues, but unknown baseline renal function. History of COPD. Type 2 diabetes, hypertension, GERD.  Transferred to [facility] from [facility]. Admitting diagnosis was sepsis due to pneumonia. Patient also evaluated for DKA.  Complaints includes nausea and vomiting.  Denied any urinary complaints. Initial labs at this facility showed potassium 6.8, creatinine 2.8. Started on insulin gtt and PO Lokelma. Hospital course remarkable for improving blood glucose level and correction of hyperkalemia.     Today is awake, alert. Dyspnea and edema improving. Urine output nonoliguric.    Allergies:  Ace inhibitors, Bactroban [mupirocin calcium], Bentyl [dicyclomine hcl], Codeine, Cymbalta [duloxetine hcl], Flagyl [metronidazole], Nifedipine, Nsaids, Penicillins, Reglan [metoclopramide hcl], Sulfa antibiotics, and Tetanus-diphth-acell pertussis    Home Meds:  Medications Prior to Admission   Medication Sig Dispense Refill Last Dose   • carboxymethylcellulose (REFRESH PLUS) 0.5 % solution  Administer 1 drop to both eyes Daily As Needed for Dry Eyes.   Past Week at Unknown time   • fluticasone (FLONASE) 50 MCG/ACT nasal spray 1 spray into the nostril(s) as directed by provider Daily.   Past Week at Unknown time   • furosemide (LASIX) 40 MG tablet Take 40 mg by mouth 2 (Two) Times a Day.   Past Week at Unknown time   • simethicone (MYLICON) 80 MG chewable tablet Chew 80 mg 3 (Three) Times a Day As Needed for Flatulence.   Past Week at Unknown time   • triamcinolone (KENALOG) 0.1 % cream Apply 1 application topically to the appropriate area as directed 2 (Two) Times a Day. Apply thin layer to the affected area 1-2 times a day   Past Week at Unknown time   • ALPRAZolam (XANAX) 1 MG tablet Take 1 mg by mouth 3 (Three) Times a Day As Needed for Anxiety.   7/9/2022   • aspirin 81 MG EC tablet Take 81 mg by mouth Daily.   7/9/2022   • atorvastatin (LIPITOR) 40 MG tablet Take 40 mg by mouth Every Night.   7/8/2022   • carvedilol (COREG) 25 MG tablet Take 25 mg by mouth 2 (Two) Times a Day With Meals.   7/9/2022   • esomeprazole (nexIUM) 40 MG capsule Take 40 mg by mouth 2 (Two) Times a Day.   7/9/2022   • ferrous gluconate (FERGON) 324 MG tablet Take 324 mg by mouth 3 (Three) Times a Day With Meals.   7/9/2022   • hydrALAZINE (APRESOLINE) 100 MG tablet Take 100 mg by mouth Every 8 (Eight) Hours.   7/9/2022   • Insulin Aspart (novoLOG) 100 UNIT/ML injection Inject 15 Units under the skin into the appropriate area as directed 3 (Three) Times a Day Before Meals.   7/9/2022   • insulin detemir (LEVEMIR) 100 UNIT/ML injection Inject 50 Units under the skin into the appropriate area as directed Every Night.   7/8/2022   • ipratropium-albuterol (DUO-NEB) 0.5-2.5 mg/3 ml nebulizer Take 3 mL by nebulization 4 (Four) Times a Day.   7/8/2022   • Melatonin 10 MG tablet Take 20 mg by mouth Every Night.   7/8/2022   • mirtazapine (REMERON) 45 MG tablet Take 45 mg by mouth Every Night.   7/9/2022   • montelukast (SINGULAIR)  10 MG tablet Take 10 mg by mouth Every Night.   7/8/2022   • Morphine (MS CONTIN) 15 MG 12 hr tablet Take 15 mg by mouth 2 (Two) Times a Day.   7/9/2022   • ondansetron (ZOFRAN) 8 MG tablet Take 8 mg by mouth Every 8 (Eight) Hours As Needed for Nausea or Vomiting.   7/9/2022   • oxyCODONE (ROXICODONE) 5 MG immediate release tablet Take 5 mg by mouth Every 8 (Eight) Hours As Needed for Moderate Pain .   7/9/2022   • predniSONE (DELTASONE) 5 MG tablet Take 5 mg by mouth Daily.   7/9/2022   • spironolactone (ALDACTONE) 50 MG tablet Take 50 mg by mouth Daily.   7/9/2022   • Vortioxetine HBr (Trintellix) 10 MG tablet Take 10 mg by mouth Daily.   7/9/2022       Medicines:  Current Facility-Administered Medications   Medication Dose Route Frequency Provider Last Rate Last Admin   • acetaminophen (TYLENOL) tablet 650 mg  650 mg Oral Q6H PRN Fabian Daigle MD   650 mg at 07/11/22 2147   • ALPRAZolam (XANAX) tablet 1 mg  1 mg Oral TID PRN Fabian Daigle MD   1 mg at 07/09/22 1614   • aspirin EC tablet 81 mg  81 mg Oral Daily Fabian Daigle MD   81 mg at 07/12/22 0803   • atorvastatin (LIPITOR) tablet 40 mg  40 mg Oral Nightly Fabian Daigle MD   40 mg at 07/11/22 2147   • carvedilol (COREG) tablet 25 mg  25 mg Oral BID With Meals Fabian Daigle MD   25 mg at 07/12/22 0803   • cefTRIAXone (ROCEPHIN) 1 g in sodium chloride 0.9 % 100 mL IVPB-VTB  1 g Intravenous Q24H Fabian Daigle  mL/hr at 07/11/22 1245 1 g at 07/11/22 1245   • dextrose (D50W) (25 g/50 mL) IV injection 25 g  25 g Intravenous Q15 Min PRN Fabian Daigle MD   25 g at 07/11/22 0511   • dextrose (D50W) (25 g/50 mL) IV injection 25 g  25 g Intravenous Q15 Min PRN Fatuma Plascencia DO       • dextrose (GLUTOSE) oral gel 15 g  15 g Oral Q15 Min PRN Fabian Daigle MD       • dextrose (GLUTOSE) oral gel 15 g  15 g Oral Q15 Min PRN Fatuma Plascencia DO       • doxycycline  (ADOXA) tablet 100 mg  100 mg Oral Q12H Anuj Cho MD   100 mg at 07/12/22 0803   • famotidine (PEPCID) tablet 20 mg  20 mg Oral Daily Anuj Cho MD   20 mg at 07/12/22 0803   • furosemide (LASIX) injection 40 mg  40 mg Intravenous Q12H Fabian Daigle MD   40 mg at 07/12/22 0617   • glucagon (human recombinant) (GLUCAGEN DIAGNOSTIC) injection 1 mg  1 mg Intramuscular Q15 Min PRN Fabian Daigle MD       • glucagon (human recombinant) (GLUCAGEN DIAGNOSTIC) injection 1 mg  1 mg Intramuscular Q15 Min PRN Fatuma Plascencia DO       • insulin detemir (LEVEMIR) injection 20 Units  20 Units Subcutaneous Nightly Fatuma Plascencia DO   20 Units at 07/11/22 2148   • Insulin Lispro (humaLOG) injection 0-14 Units  0-14 Units Subcutaneous TID AC Fabian Daigle MD   3 Units at 07/10/22 1637   • ipratropium-albuterol (DUO-NEB) nebulizer solution 3 mL  3 mL Nebulization 4x Daily - RT Fabian Daigle MD   3 mL at 07/12/22 0623   • ondansetron (ZOFRAN) injection 4 mg  4 mg Intravenous Q6H PRN Fabian Daigle MD   4 mg at 07/11/22 1532   • oxyCODONE (ROXICODONE) immediate release tablet 5 mg  5 mg Oral Q8H PRN Fabian Daigle MD   5 mg at 07/12/22 0901   • predniSONE (DELTASONE) tablet 5 mg  5 mg Oral Daily Fabian Daigle MD   5 mg at 07/12/22 0804   • simethicone (MYLICON) chewable tablet 80 mg  80 mg Oral TID PRN Fabian Daigle MD       • sodium chloride 0.9 % flush 10 mL  10 mL Intravenous Q12H Fabian Daigle MD   10 mL at 07/12/22 0803   • sodium chloride 0.9 % flush 10 mL  10 mL Intravenous PRN Fabian Daigle MD       • sodium chloride 0.9 % flush 10 mL  10 mL Intravenous Fabian Tejeda MD   10 mL at 07/11/22 0515       Past Medical History:  Past Medical History:   Diagnosis Date   • Chronic low back pain    • CKD (chronic kidney disease)    • COPD (chronic obstructive pulmonary  disease) (HCC)    • Coronary arteriosclerosis    • Diabetes mellitus (HCC)    • Gastroparesis    • GERD (gastroesophageal reflux disease)    • Hearing loss    • Hypertension    • Obesity    • Oxygen dependent        Past Surgical History:  Past Surgical History:   Procedure Laterality Date   • CORONARY ARTERY BYPASS GRAFT     • THORACOSCOPY  2019   • TONSILLECTOMY         Family History  History reviewed. No pertinent family history.    Social History  Social History     Socioeconomic History   • Marital status:    Tobacco Use   • Smoking status: Former Smoker   • Smokeless tobacco: Never Used       Review of Systems:  History obtained from chart review and the patient  General ROS: No fever or chills  Respiratory ROS: positive for - shortness of breath  Cardiovascular ROS: positive for - dyspnea on exertion and edema  No chest pain or palpitations  Gastrointestinal ROS: No abdominal pain or melena  Genito-Urinary ROS: No dysuria or hematuria  Psych ROS: No anxiety and depression  14 point ROS reviewed with the patient and negative except as noted above and in the HPI unless unable to obtain.    Objective:  Patient Vitals for the past 24 hrs:   BP Temp Temp src Pulse Resp SpO2 Weight   07/12/22 0700 -- 99.4 °F (37.4 °C) Oral -- -- -- --   07/12/22 0629 -- -- -- 74 16 98 % --   07/12/22 0623 -- -- -- 76 16 97 % --   07/12/22 0400 170/58 98 °F (36.7 °C) Axillary 72 -- 94 % 88.2 kg (194 lb 7.1 oz)   07/12/22 0200 137/48 -- -- 71 -- 93 % --   07/11/22 2000 140/58 97.9 °F (36.6 °C) -- 83 -- 98 % --   07/11/22 1835 -- -- -- 88 22 97 % --   07/11/22 1830 -- -- -- 87 20 95 % --   07/11/22 1600 165/60 -- -- 86 -- 95 % --   07/11/22 1530 -- 99.2 °F (37.3 °C) Oral -- -- -- --   07/11/22 1516 -- -- -- 83 -- 94 % --   07/11/22 1200 150/61 98.9 °F (37.2 °C) Oral 79 -- 100 % --   07/11/22 1157 -- -- -- 77 26 100 % --   07/11/22 1100 135/54 -- -- 77 -- 98 % --   07/11/22 1000 120/50 -- -- 77 -- 98 % --       Intake/Output  Summary (Last 24 hours) at 7/12/2022 0953  Last data filed at 7/12/2022 0700  Gross per 24 hour   Intake 200 ml   Output 2650 ml   Net -2450 ml     General: awake/alert   Chest:  clear to auscultation bilaterally without respiratory distress  CVS: regular rate and rhythm  Abdominal: soft, nontender, positive bowel sounds  Extremities: trace lower ext edema  Skin: warm and dry without rash  Neuro; no focal motor deficits    Labs:  Results from last 7 days   Lab Units 07/11/22  0258 07/10/22  0443 07/09/22  1053   WBC 10*3/mm3 13.05* 16.04* 15.63*   HEMOGLOBIN g/dL 7.8* 8.3* 8.2*   HEMATOCRIT % 26.6* 27.2* 28.3*   PLATELETS 10*3/mm3 314 296 252         Results from last 7 days   Lab Units 07/12/22  0347 07/11/22  0257 07/10/22  1133 07/10/22  0716   SODIUM mmol/L 142 141 136 136   POTASSIUM mmol/L 3.6 4.3 5.1 5.2   CHLORIDE mmol/L 100 103 100 102   CO2 mmol/L 36.0* 31.0* 23.0 25.0   BUN mg/dL 26* 34* 42* 49*   CREATININE mg/dL 1.17 1.49* 2.03* 2.38*   CALCIUM mg/dL 9.1 8.9 8.4* 8.5*   EGFR mL/min/1.73 67.1 50.2* 34.6* 28.6*   BILIRUBIN mg/dL 0.2 0.2  --  <0.2   ALK PHOS U/L 113 116  --  108   ALT (SGPT) U/L 19 24  --  29   AST (SGOT) U/L 19 19  --  15   GLUCOSE mg/dL 155* 80 205* 202*       Radiology:   Imaging Results (Last 72 Hours)     Procedure Component Value Units Date/Time    XR Chest 1 View [[id number]] Collected: 07/11/22 0712     Updated: 07/11/22 0718    Narrative:      EXAM/TECHNIQUE: XR CHEST 1 VW-     INDICATION: Follow up     COMPARISON: 7/9/2022     FINDINGS:     Cardiac silhouette is stable. Marked worsening of consolidation in the  RIGHT mid and upper lung zone. No definite change in patchy LEFT lower  lobe airspace opacity. No large pleural effusion. No visible  pneumothorax. Prior median sternotomy.       Impression:         Marked worsening of consolidation in the RIGHT mid and upper lung zone.  No significant change in LEFT lower lobe opacity.  This report was finalized on 07/11/2022 07:15 by   Souleymane Fraga MD.    US Renal Bilateral [[id number]] Collected: 07/10/22 1334     Updated: 07/10/22 1338    Narrative:      US RENAL BILATERAL- 7/10/2022 11:28 AM CDT     HISTORY: rafa     COMPARISON: None       TECHNIQUE: Multiple longitudinal and transverse realtime sonographic  images of the kidneys and urinary bladder are obtained.      FINDINGS:      RIGHT KIDNEY: 10.4 x 6.1 x 6.0 cm. Diffuse cortical thinning. No solid  or cystic masses. The central echo complex is compact with no evidence  for hydronephrosis. No nephrolithiasis or abnormal perinephric fluid  collections . No hydroureter.      LEFT KIDNEY: 11.2 x 5.7 x 5.5 cm. Diffuse cortical thinning. No solid or  cystic masses. The central echo complex is compact with no evidence for  hydronephrosis. No nephrolithiasis or abnormal perinephric fluid  collections . No hydroureter.      PELVIS: Urinary bladder is completely decompressed by Zepeda catheter.       Impression:      1. Bilateral diffuse renal cortical thinning, appearance suggesting  chronic medical renal disease. No hydronephrosis.  2. Urinary bladder is completely decompressed by Zepeda catheter.        This report was finalized on 07/10/2022 13:35 by Dr Christian Szymanski, .    US Venous Doppler Lower Extremity Bilateral (duplex) [[id number]] Collected: 07/10/22 0915     Updated: 07/10/22 0918    Narrative:      History: Swelling       Impression:      Impression: There is no evidence of deep venous thrombosis or  superficial thrombophlebitis of right or left lower extremities.     Comments: Bilateral lower extremity venous duplex exam was performed  using color Doppler flow, Doppler waveform analysis, and grayscale  imaging, with and without compression. There is no evidence of deep  venous thrombosis in the common femoral, superficial femoral, popliteal,  peroneal, anterior tibial, and posterior tibial veins bilaterally. No  thrombus is identified in the saphenofemoral junctions and greater  saphenous  veins bilaterally.         This report was finalized on 07/10/2022 09:15 by Dr. Antony Kay MD.    XR Chest 1 View [[id number]] Collected: 07/09/22 1051     Updated: 07/09/22 1056    Narrative:      XR CHEST 1 VW- 7/9/2022 10:39 AM CDT     HISTORY: pneumonia     COMPARISON: None.     FINDINGS:      Bilateral interlobular septal thickening with subpleural Kerley B lines.  Wedge-shaped patchy infiltrate identified in the right upper lobe with  additional patchy infiltrate in both lower lobes. Blunted left  costophrenic angle reflecting trace effusion or perhaps scarring. Mild  cardiomegaly with central pulmonary vascular congestion. Prior coronary  bypass. Remote granulomatous calcification in the anil and mediastinum.  No acute bony abnormality.       Impression:      1. Interstitial edema present with bilateral peribronchial and  interlobular septal thickening. Multifocal patchy alveolar infiltrate is  well, and both lung bases as well as in the right upper lobe, which may  reflect either atypical pneumonia or areas of developing julio pulmonary  edema.        This report was finalized on 07/09/2022 10:53 by Dr Christian Szymanski, .          Culture:  Blood Culture   Date Value Ref Range Status   07/09/2022 No growth at 24 hours  Preliminary   07/09/2022 No growth at 24 hours  Preliminary         Assessment   1.  Acute kidney injury--resolving  2.  Unknown baseline renal function  3.  Hyperkalemia--resolved  4.  Hyponatremia--improved  5.  Type 2 diabetes with diabetic ketoacidosis--improving  6.  Acute respiratory failure  7.  Pulmonary hypertension    Plan:  1.  Continue diuretics, transition to PO dosing  2.  Renal function improving, continue to monitor labs      LYNDA Meade  7/12/2022  09:53 CDT

## 2022-07-12 NOTE — PROGRESS NOTES
[facility]  INPATIENT PROGRESS NOTE    Patient Name: Isaias Cruz  Date of Admission: 7/9/2022  Today's Date: 07/12/22  Length of Stay: 3  Primary Care Physician: Mumtaz Teresa PA    Subjective   Chief Complaint: follow up pneumonia  HPI   Doing ok.  Afebrile.  Less SOA.  Off Bipap.  Breathing comfortably on 6L NC.  He is sitting up in the chair.        Review of Systems   Constitutional: Positive for fatigue. Negative for fever.   HENT: Negative for congestion and ear pain.    Eyes: Negative for redness and visual disturbance.   Respiratory: Positive for cough and shortness of breath. Negative for wheezing.    Cardiovascular: Negative for chest pain and palpitations.   Gastrointestinal: Negative for abdominal pain, diarrhea, nausea and vomiting.   Endocrine: Negative for cold intolerance and heat intolerance.   Genitourinary: Negative for dysuria and frequency.   Musculoskeletal: Negative for arthralgias and back pain.   Skin: Negative for rash and wound.   Neurological: Positive for weakness. Negative for dizziness and headaches.   Psychiatric/Behavioral: Negative for confusion. The patient is not nervous/anxious.           All pertinent negatives and positives are as above. All other systems have been reviewed and are negative unless otherwise stated.     Objective    Temp:  [97.9 °F (36.6 °C)-99.4 °F (37.4 °C)] 99.4 °F (37.4 °C)  Heart Rate:  [71-88] 80  Resp:  [14-22] 16  BP: (137-170)/(47-61) 165/47  Physical Exam  Vitals reviewed.   Constitutional:       Appearance: He is well-developed.   HENT:      Head: Normocephalic and atraumatic.      Right Ear: External ear normal.      Left Ear: External ear normal.      Nose: Nose normal.   Eyes:      General: No scleral icterus.        Right eye: No discharge.         Left eye: No discharge.      Conjunctiva/sclera: Conjunctivae normal.      Pupils: Pupils are equal, round, and reactive to light.   Neck:       Thyroid: No thyromegaly.      Trachea: No tracheal deviation.   Cardiovascular:      Rate and Rhythm: Normal rate and regular rhythm.      Heart sounds: Normal heart sounds. No murmur heard.    No friction rub. No gallop.   Pulmonary:      Effort: Pulmonary effort is normal. No respiratory distress.      Breath sounds: No stridor. Decreased breath sounds and rhonchi present. No rales.   Chest:      Chest wall: No tenderness.   Abdominal:      General: Bowel sounds are normal. There is no distension.      Palpations: Abdomen is soft. There is no mass.      Tenderness: There is no abdominal tenderness. There is no guarding or rebound.      Hernia: No hernia is present.   Musculoskeletal:         General: No deformity. Normal range of motion.      Cervical back: Normal range of motion and neck supple.   Lymphadenopathy:      Cervical: No cervical adenopathy.   Skin:     General: Skin is warm and dry.      Coloration: Skin is not pale.      Findings: No erythema or rash.   Neurological:      Mental Status: He is alert and oriented to person, place, and time.      Cranial Nerves: No cranial nerve deficit.      Motor: No abnormal muscle tone.      Coordination: Coordination normal.      Deep Tendon Reflexes: Reflexes are normal and symmetric. Reflexes normal.   Psychiatric:         Behavior: Behavior normal.         Thought Content: Thought content normal.         Judgment: Judgment normal.             Results Review:  I have reviewed the labs, radiology results, and diagnostic studies.    Laboratory Data:   Results from last 7 days   Lab Units 07/11/22  0258 07/10/22  0443 07/09/22  1053   WBC 10*3/mm3 13.05* 16.04* 15.63*   HEMOGLOBIN g/dL 7.8* 8.3* 8.2*   HEMATOCRIT % 26.6* 27.2* 28.3*   PLATELETS 10*3/mm3 314 296 252        Results from last 7 days   Lab Units 07/12/22  0347 07/11/22  0257 07/10/22  1133 07/10/22  0716   SODIUM mmol/L 142 141 136 136   POTASSIUM mmol/L 3.6 4.3 5.1 5.2   CHLORIDE mmol/L 100 103 100 102    CO2 mmol/L 36.0* 31.0* 23.0 25.0   BUN mg/dL 26* 34* 42* 49*   CREATININE mg/dL 1.17 1.49* 2.03* 2.38*   CALCIUM mg/dL 9.1 8.9 8.4* 8.5*   BILIRUBIN mg/dL 0.2 0.2  --  <0.2   ALK PHOS U/L 113 116  --  108   ALT (SGPT) U/L 19 24  --  29   AST (SGOT) U/L 19 19  --  15   GLUCOSE mg/dL 155* 80 205* 202*       Culture Data:   Blood Culture   Date Value Ref Range Status   07/09/2022 No growth at 24 hours  Preliminary   07/09/2022 No growth at 24 hours  Preliminary       Radiology Data:   Imaging Results (Last 24 Hours)     ** No results found for the last 24 hours. **          I have reviewed the patient's current medications.     Assessment/Plan     Active Hospital Problems    Diagnosis    • Fluid overload      1.  Pneumonia  -IV abx      2.  Acute pulmonary edema  -IV Diuretics    3.  CAD  -ASA  -Statin  -Coreg    4.  T2DM  -Levemir  -SSI    5.  GERD  -Pepcid    6.  HTN  -Coreg    7.  COPD  -PO steroids  -nebs    8.  LATRICE on CKD  -IV diuretics  -nephrology following    9.  Acute Hypoxic Respiratory failure  -IV diuretics  -IV abx          Discharge Planning: I expect the patient to be discharged to home in 3-4 days    Electronically signed by Anuj Cho MD, 07/12/22, 11:58 CDT.

## 2022-07-12 NOTE — THERAPY TREATMENT NOTE
Patient Name: Isaias Cruz  : 1950    MRN: [id number]                              Today's Date: 2022       Admit Date: 2022    Visit Dx:     ICD-10-CM ICD-9-CM   1. Impaired mobility and ADLs  Z74.09 V49.89    Z78.9      Patient Active Problem List   Diagnosis   • Fluid overload     Past Medical History:   Diagnosis Date   • Chronic low back pain    • CKD (chronic kidney disease)    • COPD (chronic obstructive pulmonary disease) ([facility])    • Coronary arteriosclerosis    • Diabetes mellitus (HCC)    • Gastroparesis    • GERD (gastroesophageal reflux disease)    • Hearing loss    • Hypertension    • Obesity    • Oxygen dependent      Past Surgical History:   Procedure Laterality Date   • CORONARY ARTERY BYPASS GRAFT     • THORACOSCOPY  2019   • TONSILLECTOMY        General Information     Row Name 22 1400          OT Time and Intention    Document Type therapy note (daily note)  -CROW (r) AD (t) CROW (c)     Mode of Treatment occupational therapy  -CROW (r) AD (t) CROW (c)     Row Name 22 1400          General Information    Patient Profile Reviewed yes  -CROW (r) AD (t) JSTEFANY (c)     Existing Precautions/Restrictions oxygen therapy device and L/min;fall  -CROW (r) AD (t) JJ (c)           User Key  (r) = Recorded By, (t) = Taken By, (c) = Cosigned By    Initials Name Provider Type    Mariela Le, OTR/L, CSRS Occupational Therapist    Cynthia Hunter, OT Student OT Student                 Mobility/ADL's     Row Name 22 1400          Bed Mobility    Bed Mobility scooting/bridging;supine-sit;sit-supine  -CROW (r) AD (t) JJ (c)     Scooting/Bridging [place] (Bed Mobility) maximum assist (25% patient effort);1 person assist  -CROW (r) AD (t) JJ (c)     Sit-Supine [place] (Bed Mobility) 1 person assist;minimum assist (75% patient effort);verbal cues  -CROW (r) AD (t) JJ (c)     Assistive Device (Bed Mobility) draw sheet;head of bed elevated;bed rails  -CROW (r) AD (t) JSTEFANY (c)     Comment,  (Bed Mobility) Up OOB on way to toilet at arrival  -JJ (r) AD (t) JJ (c)     Row Name 07/12/22 1400          Transfers    Transfers sit-stand transfer;stand-sit transfer;toilet transfer  -JJ (r) AD (t) JJ (c)     Comment, (Transfers) stand sit x5 to increase endurance and safety for fxl transfers and mobility to improve ADL independence. RB required between each  -JJ (r) AD (t) JJ (c)     Sit-Stand [place] (Transfers) contact guard;verbal cues;1 person assist  -JJ (r) AD (t) JJ (c)     Stand-Sit [place] (Transfers) contact guard;verbal cues  -JJ (r) AD (t) JJ (c)     [place] (Toilet Transfer) minimum assist (75% patient effort);contact guard;verbal cues  -JJ (r) AD (t) JJ (c)     Row Name 07/12/22 1400          Toilet Transfer    Type (Toilet Transfer) stand pivot/stand step  -JJ (r) AD (t) JJ (c)     Row Name 07/12/22 1400          Activities of Daily Living    BADL Assessment/Intervention lower body dressing;toileting;grooming  -JJ (r) AD (t) JJ (c)     Row Name 07/12/22 1400          Lower Body Dressing Assessment/Training    [place] (Lower Body Dressing) minimum assist (75% patient effort);socks  -JJ (r) AD (t) JJ (c)     Position (Lower Body Dressing) edge of bed sitting  -JJ (r) AD (t) JJ (c)     Comment, (Lower Body Dressing) Pt adjusted socks min A, however difficulty reaching toe which would impact ability to completely don sock  -JJ (r) AD (t) JJ (c)     Row Name 07/12/22 1400          Toileting Assessment/Training    [place] (Toileting) adjust/manage clothing;perform perineal hygiene;supervision;contact guard assist  -JJ (r) AD (t) JJ (c)     Assistive Devices (Toileting) commode, bedside without drop arms  -JJ (r) AD (t) JJ (c)     Position (Toileting) unsupported sitting;unsupported standing  -JJ (r) AD (t) CROW (earl)     Comment, (Toileting) Supervision to ensure throughouness of perineal hygiene  -CROW (radha) LITZY (batsheva) CROW (earl)     Row Name 07/12/22 1400          Grooming  Assessment/Training    [place] Level (Grooming) wash face, hands;set up  -JJ (r) AD (t) JJ (c)     Position (Grooming) edge of bed sitting  -JJ (r) AD (t) JJ (c)           User Key  (r) = Recorded By, (t) = Taken By, (c) = Cosigned By    Initials Name Provider Type    Mariela Le, OTR/L, CSRS Occupational Therapist    Cynthia Hunter, OT Student OT Student               Obj/Interventions     Row Name 07/12/22 1400          Shoulder (Therapeutic Exercise)    Shoulder (Therapeutic Exercise) isometric exercises  -JJ (r) AD (t) JJ (c)     Shoulder Isometrics (Therapeutic Exercise) bilateral;extension;flexion;aBduction;10 repetitions;2 sets;3 second hold  -JJ (r) AD (t) JJ (c)     Row Name 07/12/22 1400          Elbow/Forearm (Therapeutic Exercise)    Elbow/Forearm (Therapeutic Exercise) isometric exercise  -JJ (r) AD (t) JJ (c)     Elbow/Forearm Isometrics (Therapeutic Exercise) bilateral;flexion;extension;10 repetitions;2 sets;5 second hold  -JJ (r) AD (t) JJ (c)     Row Name 07/12/22 1400          Motor Skills    Therapeutic Exercise shoulder;elbow/forearm  -JJ (r) AD (t) JJ (c)     Row Name 07/12/22 1400          Balance    Balance Assessment sitting static balance;sitting dynamic balance;standing static balance  -JJ (r) AD (t) JJ (c)     Static Sitting Balance supervision  -JJ (r) AD (t) JJ (c)     Dynamic Sitting Balance contact guard  -JJ (r) AD (t) JJ (c)     Position, Sitting Balance unsupported;sitting edge of bed  -JJ (r) AD (t) JJ (c)     Static Standing Balance minimal assist;contact guard;verbal cues;1-person assist  -JJ (r) AD (t) JJ (c)     Position/Device Used, Standing Balance unsupported  -JJ (r) AD (t) JJ (c)     Comment, Balance Unsteady static standing. Pt reports feeling very weak  -JJ (r) AD (t) JJ (c)           User Key  (r) = Recorded By, (t) = Taken By, (c) = Cosigned By    Initials Name Provider Type    Mariela Le, OTR/L, CSRS Occupational Therapist    LITZY Cabrera,  Cynthia OT Student OT Student               Goals/Plan    No documentation.                Clinical Impression     Row Name 07/12/22 1400          Pain Assessment    Pretreatment Pain Rating 6/10  -JJ (r) AD (t) JJ (c)     Posttreatment Pain Rating 6/10  -JJ (r) AD (t) JJ (c)     Pain Location generalized  -JJ (r) AD (t) JJ (c)     Pain Location - back  -JJ (r) AD (t) JJ (c)     Row Name 07/12/22 1400          Plan of Care Review    Plan of Care Reviewed With patient  -JJ (r) AD (t) JJ (c)     Outcome Evaluation OT tx complete. At OTS arrival pt was up OOB with nsg on way to bedside commode. Pt SBA-CGA with toileting skills to check for thoroughness with perineal hygiene. Pt completed sit<>stand from commode and t/f back to EOB with CGA, requiring rest break following. Pt completed face and hand washing sitting EOB with no LOB. Pt completed isometric UE exercises requiring rest breaks and vcs for PLB. Pt completed sit<>stand x5 to increase endurance and safety for fxl transfers for better independence with ADLs. Extensive rest breaks required between each. Pt stated he needed to rest and requested to lay down rather than t/f to chair. Pt sit-supine with min A, however max Ax1 with vcs for scooting up in bed. O2 remained upper 80s to mid 90s throughout session. Pt with significantly decreased activity tolerance. Continue OT POC.  -JJ (r) AD (t) JJ (c)     Row Name 07/12/22 1400          Vital Signs    O2 Delivery Pre Treatment nasal cannula  6L  -JJ (r) AD (t) JJ (c)     O2 Delivery Intra Treatment nasal cannula  6L  -JJ (r) AD (t) JJ (c)     O2 Delivery Post Treatment nasal cannula  6L  -JJ (r) AD (t) JJ (c)     Row Name 07/12/22 1400          Positioning and Restraints    Pre-Treatment Position in bed  -JJ (r) AD (t) JJ (c)     Post Treatment Position bed  -JJ (r) AD (t) JJ (c)     In Bed fowlers;call light within reach;encouraged to call for assist;side rails up x3;patient within staff view  -JJ (r) LITZY (t) CROW (c)            User Key  (r) = Recorded By, (t) = Taken By, (c) = Cosigned By    Initials Name Provider Type    STEFANYMariela Lincoln OTR/L, TONYS Occupational Therapist    Cynthia Hunter, OT Student OT Student               Outcome Measures     Row Name 07/12/22 1400          How much help from another is currently needed...    Putting on and taking off regular lower body clothing? 3  -JJ (r) AD (t) JJ (c)     Bathing (including washing, rinsing, and drying) 2  -JJ (r) AD (t) JJ (c)     Toileting (which includes using toilet bed pan or urinal) 3  -JJ (r) AD (t) JJ (c)     Putting on and taking off regular upper body clothing 3  -JJ (r) AD (t) JJ (c)     Taking care of personal grooming (such as brushing teeth) 3  -JJ (r) AD (t) JJ (c)     Eating meals 4  -JJ (r) AD (t) JJ (c)     AM-PAC 6 Clicks Score (OT) 18  -JJ (r) AD (t)     Row Name 07/12/22 1400          Functional Assessment    Outcome Measure Options AM-PAC 6 Clicks Daily Activity (OT)  -JJ (r) AD (t) JJ (c)           User Key  (r) = Recorded By, (t) = Taken By, (c) = Cosigned By    Initials Name Provider Type    CROW Mariela Fitzpatrick OTR/L, TONYS Occupational Therapist    Cynthia Hunter, OT Student OT Student                Occupational Therapy Education                 Title: PT OT SLP Therapies (In Progress)     Topic: Occupational Therapy (In Progress)     Point: ADL training (Done)     Description:   Instruct learner(s) on proper safety adaptation and remediation techniques during self care or transfers.   Instruct in proper use of assistive devices.              Learning Progress Summary           Patient Acceptance, E, VU by AD at 7/12/2022 1533    Acceptance, E, VU by AD at 7/11/2022 1416    Comment: benefits, role of OT, PLB                   Point: Home exercise program (Not Started)     Description:   Instruct learner(s) on appropriate technique for monitoring, assisting and/or progressing therapeutic exercises/activities.              Learner  Progress:  Not documented in this visit.          Point: Precautions (Done)     Description:   Instruct learner(s) on prescribed precautions during self-care and functional transfers.              Learning Progress Summary           Patient Acceptance, E, VU by AD at 7/12/2022 1533    Acceptance, E, VU by AD at 7/11/2022 1416    Comment: benefits, role of OT, PLB                   Point: Body mechanics (Not Started)     Description:   Instruct learner(s) on proper positioning and spine alignment during self-care, functional mobility activities and/or exercises.              Learner Progress:  Not documented in this visit.                      User Key     Initials Effective Dates Name Provider Type Discipline    AD 05/04/22 -  Cynthia Cabrera, OT Student OT Student OT              OT Recommendation and Plan  Planned Therapy Interventions (OT): activity tolerance training, BADL retraining, functional balance retraining, occupation/activity based interventions, patient/caregiver education/training, strengthening exercise, transfer/mobility retraining  Therapy Frequency (OT): 5 times/wk  Plan of Care Review  Plan of Care Reviewed With: patient  Outcome Evaluation: OT tx complete. At OTS arrival pt was up OOB with nsg on way to bedside commode. Pt SBA-CGA with toileting skills to check for thoroughness with perineal hygiene. Pt completed sit<>stand from commode and t/f back to EOB with CGA, requiring rest break following. Pt completed face and hand washing sitting EOB with no LOB. Pt completed isometric UE exercises requiring rest breaks and vcs for PLB. Pt completed sit<>stand x5 to increase endurance and safety for fxl transfers for better independence with ADLs. Extensive rest breaks required between each. Pt stated he needed to rest and requested to lay down rather than t/f to chair. Pt sit-supine with min A, however max Ax1 with vcs for scooting up in bed. O2 remained upper 80s to mid 90s throughout session. Pt with  significantly decreased activity tolerance. Continue OT POC.     Time Calculation:    Time Calculation- OT     Row Name 07/12/22 1400             Time Calculation- OT    OT Start Time 1400  -JJ (r) AD (t) JJ (c)      OT Stop Time 1440  -JJ (r) AD (t) JJ (c)      OT Time Calculation (min) 40 min  -JJ (r) AD (t)      OT Received On 07/12/22  -JJ (r) AD (t) JJ (c)              Timed Charges    03310 - OT Therapeutic Exercise Minutes 15  -JJ (r) AD (t) JJ (c)      68344 - OT Self Care/Mgmt Minutes 25  -JJ (r) AD (t) JJ (c)              Total Minutes    Timed Charges Total Minutes 40  -JJ (r) AD (t)       Total Minutes 40  -JJ (r) AD (t)            User Key  (r) = Recorded By, (t) = Taken By, (c) = Cosigned By    Initials Name Provider Type    Mariela Le, DON/L, CSRS Occupational Therapist    Cynthia Hunter OT Student OT Student                       Cynthia Cabrera OT Student  7/12/2022

## 2022-07-12 NOTE — PLAN OF CARE
Goal Outcome Evaluation:  Plan of Care Reviewed With: patient           Outcome Evaluation: OT tx complete. At OTS arrival pt was up OOB with nsg on way to bedside commode. Pt SBA-CGA with toileting skills to check for thoroughness with perineal hygiene. Pt completed sit<>stand from commode and t/f back to EOB with CGA, requiring rest break following. Pt completed face and hand washing sitting EOB with no LOB. Pt completed isometric UE exercises requiring rest breaks and vcs for PLB. Pt completed sit<>stand x5 to increase endurance and safety for fxl transfers for better independence with ADLs. Extensive rest breaks required between each. Pt stated he needed to rest and requested to lay down rather than t/f to chair. Pt sit-supine with min A, however max Ax1 with vcs for scooting up in bed. O2 remained upper 80s to mid 90s throughout session. Pt with significantly decreased activity tolerance. Continue OT POC.

## 2022-07-12 NOTE — PLAN OF CARE
Goal Outcome Evaluation:  Plan of Care Reviewed With: patient           Outcome Evaluation: Pt reports productive cough this shift, outlook and disposition brighter after visit with family.  Sat on side of bed and assisted with bath, then stood at side of bed briefly.  Tolerated activity without s/s respiratory distress, SpO2 noted 95-97% on high-flow nasal cannula.  Pt called out when ready to go to sleep for the night, asked to be placed on bipap.  Adequate SpO2 throughout night, titrated FiO2 on bipap down to 40%, pt tolerated well.  Requested to get up to chair for breakfast this AM.  Consistently removes BP cuff when attempting to take BP.  Safety maintained, assisted with weight shifting, care plan updated.

## 2022-07-13 LAB
ALBUMIN SERPL-MCNC: 2.8 G/DL (ref 3.5–5.2)
ALBUMIN/GLOB SERPL: 1.1 G/DL
ALP SERPL-CCNC: 117 U/L (ref 39–117)
ALT SERPL W P-5'-P-CCNC: 22 U/L (ref 1–41)
ANION GAP SERPL CALCULATED.3IONS-SCNC: 9 MMOL/L (ref 5–15)
AST SERPL-CCNC: 18 U/L (ref 1–40)
BILIRUB SERPL-MCNC: 0.2 MG/DL (ref 0–1.2)
BUN SERPL-MCNC: 25 MG/DL (ref 8–23)
BUN/CREAT SERPL: 23.1 (ref 7–25)
CALCIUM SPEC-SCNC: 8.7 MG/DL (ref 8.6–10.5)
CHLORIDE SERPL-SCNC: 97 MMOL/L (ref 98–107)
CO2 SERPL-SCNC: 35 MMOL/L (ref 22–29)
CREAT SERPL-MCNC: 1.08 MG/DL (ref 0.76–1.27)
EGFRCR SERPLBLD CKD-EPI 2021: 73.8 ML/MIN/1.73
GLOBULIN UR ELPH-MCNC: 2.6 GM/DL
GLUCOSE BLDC GLUCOMTR-MCNC: 138 MG/DL (ref 70–130)
GLUCOSE BLDC GLUCOMTR-MCNC: 237 MG/DL (ref 70–130)
GLUCOSE BLDC GLUCOMTR-MCNC: 276 MG/DL (ref 70–130)
GLUCOSE BLDC GLUCOMTR-MCNC: 276 MG/DL (ref 70–130)
GLUCOSE BLDC GLUCOMTR-MCNC: 435 MG/DL (ref 70–130)
GLUCOSE SERPL-MCNC: 155 MG/DL (ref 65–99)
POTASSIUM SERPL-SCNC: 3.5 MMOL/L (ref 3.5–5.2)
PROT SERPL-MCNC: 5.4 G/DL (ref 6–8.5)
SODIUM SERPL-SCNC: 141 MMOL/L (ref 136–145)

## 2022-07-13 PROCEDURE — 94799 UNLISTED PULMONARY SVC/PX: CPT

## 2022-07-13 PROCEDURE — 82962 GLUCOSE BLOOD TEST: CPT

## 2022-07-13 PROCEDURE — 63710000001 INSULIN LISPRO (HUMAN) PER 5 UNITS: Performed by: INTERNAL MEDICINE

## 2022-07-13 PROCEDURE — 94664 DEMO&/EVAL PT USE INHALER: CPT

## 2022-07-13 PROCEDURE — 63710000001 INSULIN LISPRO (HUMAN) PER 5 UNITS: Performed by: FAMILY MEDICINE

## 2022-07-13 PROCEDURE — 25010000002 CEFTRIAXONE PER 250 MG: Performed by: INTERNAL MEDICINE

## 2022-07-13 PROCEDURE — 63710000001 PREDNISONE PER 5 MG: Performed by: INTERNAL MEDICINE

## 2022-07-13 PROCEDURE — 94660 CPAP INITIATION&MGMT: CPT

## 2022-07-13 PROCEDURE — 97110 THERAPEUTIC EXERCISES: CPT | Performed by: OCCUPATIONAL THERAPIST

## 2022-07-13 PROCEDURE — 80053 COMPREHEN METABOLIC PANEL: CPT | Performed by: NURSE PRACTITIONER

## 2022-07-13 PROCEDURE — 94761 N-INVAS EAR/PLS OXIMETRY MLT: CPT

## 2022-07-13 PROCEDURE — 63710000001 INSULIN DETEMIR PER 5 UNITS: Performed by: INTERNAL MEDICINE

## 2022-07-13 PROCEDURE — 97535 SELF CARE MNGMENT TRAINING: CPT | Performed by: OCCUPATIONAL THERAPIST

## 2022-07-13 RX ORDER — INSULIN LISPRO 100 [IU]/ML
25 INJECTION, SOLUTION INTRAVENOUS; SUBCUTANEOUS ONCE
Status: COMPLETED | OUTPATIENT
Start: 2022-07-13 | End: 2022-07-13

## 2022-07-13 RX ADMIN — IPRATROPIUM BROMIDE AND ALBUTEROL SULFATE 3 ML: 2.5; .5 SOLUTION RESPIRATORY (INHALATION) at 19:37

## 2022-07-13 RX ADMIN — INSULIN LISPRO 25 UNITS: 100 INJECTION, SOLUTION INTRAVENOUS; SUBCUTANEOUS at 22:47

## 2022-07-13 RX ADMIN — CARVEDILOL 25 MG: 25 TABLET, FILM COATED ORAL at 08:31

## 2022-07-13 RX ADMIN — SODIUM CHLORIDE 1 G: 9 INJECTION, SOLUTION INTRAVENOUS at 13:01

## 2022-07-13 RX ADMIN — IPRATROPIUM BROMIDE AND ALBUTEROL SULFATE 3 ML: 2.5; .5 SOLUTION RESPIRATORY (INHALATION) at 15:02

## 2022-07-13 RX ADMIN — OXYCODONE HYDROCHLORIDE 5 MG: 5 TABLET ORAL at 02:04

## 2022-07-13 RX ADMIN — ASPIRIN 81 MG: 81 TABLET ORAL at 08:31

## 2022-07-13 RX ADMIN — DOXYCYCLINE 100 MG: 100 TABLET, FILM COATED ORAL at 17:05

## 2022-07-13 RX ADMIN — Medication 10 ML: at 08:36

## 2022-07-13 RX ADMIN — ATORVASTATIN CALCIUM 40 MG: 40 TABLET, FILM COATED ORAL at 21:01

## 2022-07-13 RX ADMIN — INSULIN DETEMIR 20 UNITS: 100 INJECTION, SOLUTION SUBCUTANEOUS at 21:26

## 2022-07-13 RX ADMIN — PREDNISONE 5 MG: 5 TABLET ORAL at 08:32

## 2022-07-13 RX ADMIN — Medication 10 ML: at 21:01

## 2022-07-13 RX ADMIN — FAMOTIDINE 20 MG: 20 TABLET, FILM COATED ORAL at 08:31

## 2022-07-13 RX ADMIN — OXYCODONE HYDROCHLORIDE 5 MG: 5 TABLET ORAL at 12:57

## 2022-07-13 RX ADMIN — IPRATROPIUM BROMIDE AND ALBUTEROL SULFATE 3 ML: 2.5; .5 SOLUTION RESPIRATORY (INHALATION) at 11:07

## 2022-07-13 RX ADMIN — DOXYCYCLINE 100 MG: 100 TABLET, FILM COATED ORAL at 05:21

## 2022-07-13 RX ADMIN — CARVEDILOL 25 MG: 25 TABLET, FILM COATED ORAL at 17:05

## 2022-07-13 RX ADMIN — INSULIN LISPRO 8 UNITS: 100 INJECTION, SOLUTION INTRAVENOUS; SUBCUTANEOUS at 11:48

## 2022-07-13 RX ADMIN — ACETAMINOPHEN 650 MG: 325 TABLET, FILM COATED ORAL at 17:08

## 2022-07-13 RX ADMIN — ALPRAZOLAM 1 MG: 0.5 TABLET ORAL at 21:01

## 2022-07-13 RX ADMIN — INSULIN LISPRO 5 UNITS: 100 INJECTION, SOLUTION INTRAVENOUS; SUBCUTANEOUS at 17:05

## 2022-07-13 RX ADMIN — ALPRAZOLAM 1 MG: 0.5 TABLET ORAL at 12:57

## 2022-07-13 RX ADMIN — IPRATROPIUM BROMIDE AND ALBUTEROL SULFATE 3 ML: 2.5; .5 SOLUTION RESPIRATORY (INHALATION) at 06:54

## 2022-07-13 RX ADMIN — OXYCODONE HYDROCHLORIDE 5 MG: 5 TABLET ORAL at 21:01

## 2022-07-13 RX ADMIN — FUROSEMIDE 40 MG: 40 TABLET ORAL at 08:31

## 2022-07-13 NOTE — PLAN OF CARE
Goal Outcome Evaluation:  Plan of Care Reviewed With: patient, spouse           Outcome Evaluation: OT tx complete. Pts wife in room at arrival, with many questions regarding hospital POC and following steps. Wife desires pt return home with HH services, and states they have use HH services in the past. Pt initally not interested in therapy this date, however with encouragement and desire to use toilet, pt willing to participate. Pt completed bed mobility with min A this date, with O2 dropping to 90, which is improved from previous session. Pt dizzy upon sitting requiring rest break before stand step t/f to toilet. Pt adjusted socks EOB SBA with vsc for PLB. Pt stood and completed t/f to toilet with CGA. Pt completed toileting skills SBA with CGA for standing balance. Pt completed sit<>stand 2x4 with RB between sets to improve endurance and safety with fxl transfers and ADL independence. Pt reports fatigue at EOS, with pain in neck. Pt and wife educated on therapy POC and benefits of mobility and exercise. Both voice understanding. Continue OT POC. Recommend d/c home with HH services for continued therapy with assist vs short term SNF pending pt progress and families ability to provide required assistance.

## 2022-07-13 NOTE — PLAN OF CARE
Goal Outcome Evaluation:  Plan of Care Reviewed With: patient        Progress: no change  Outcome Evaluation: He co pain, medication given. Zepeda is DC, and he has voided. cont IV ABX. possible home in am. cont to monitor.

## 2022-07-13 NOTE — THERAPY TREATMENT NOTE
Patient Name: Isaias Cruz  : 1950    MRN: [id number]                              Today's Date: 2022       Admit Date: 2022    Visit Dx:     ICD-10-CM ICD-9-CM   1. Impaired mobility and ADLs  Z74.09 V49.89    Z78.9      Patient Active Problem List   Diagnosis   • Fluid overload     Past Medical History:   Diagnosis Date   • Chronic low back pain    • CKD (chronic kidney disease)    • COPD (chronic obstructive pulmonary disease) ([facility])    • Coronary arteriosclerosis    • Diabetes mellitus (HCC)    • Gastroparesis    • GERD (gastroesophageal reflux disease)    • Hearing loss    • Hypertension    • Obesity    • Oxygen dependent      Past Surgical History:   Procedure Laterality Date   • CORONARY ARTERY BYPASS GRAFT     • THORACOSCOPY  2019   • TONSILLECTOMY        General Information     Row Name 22 1215          OT Time and Intention    Document Type therapy note (daily note)  -CROW (r) AD (t) CROW (c)     Mode of Treatment occupational therapy  -CROW (r) AD (t) CROW (c)     Row Name 22 1215          General Information    Patient Profile Reviewed yes  -CROW (r) AD (t) CROW (c)     Existing Precautions/Restrictions oxygen therapy device and L/min;fall  -CROW (r) AD (t) JJ (c)           User Key  (r) = Recorded By, (t) = Taken By, (c) = Cosigned By    Initials Name Provider Type    Mariela Le, OTR/L, CSRS Occupational Therapist    Cynthia Hunter, OT Student OT Student                 Mobility/ADL's     Row Name 22 1215          Bed Mobility    Bed Mobility scooting/bridging;sit-supine;supine-sit  -CROW (r) AD (t) JSTEFANY (c)     Scooting/Bridging [place] (Bed Mobility) maximum assist (25% patient effort);2 person assist  -CROW (r) AD (t) JJ (c)     Supine-Sit [place] (Bed Mobility) minimum assist (75% patient effort);verbal cues  -CROW (r) AD (t) JSTEFANY (c)     Sit-Supine [place] (Bed Mobility) minimum assist (75% patient effort);verbal cues  -CROW (r) AD (t) CROW (c)     Assistive  Device (Bed Mobility) bed rails;draw sheet;head of bed elevated  -JJ (r) AD (t) JJ (c)     Row Name 07/13/22 1215          Transfers    Transfers sit-stand transfer;stand-sit transfer;toilet transfer  -JJ (r) AD (t) JJ (c)     Comment, (Transfers) stand<>sit 2x4 with RB to improve endurance and safety with fxl transfers for ADL and mobility independence  -JJ (r) AD (t) JJ (c)     Sit-Stand [place] (Transfers) contact guard;verbal cues  -JJ (r) AD (t) JJ (c)     Stand-Sit [place] (Transfers) contact guard;verbal cues  -JJ (r) AD (t) JJ (c)     [place] Level (Toilet Transfer) contact guard;verbal cues;1 person assist  -JJ (r) AD (t) JJ (c)     Row Name 07/13/22 1215          Toilet Transfer    Type (Toilet Transfer) stand pivot/stand step  -JJ (r) AD (t) JJ (c)     Row Name 07/13/22 1215          Activities of Daily Living    BADL Assessment/Intervention lower body dressing;toileting;grooming  -JJ (r) AD (t) JJ (c)     Row Name 07/13/22 [facility]          Lower Body Dressing Assessment/Training    [place] Level (Lower Body Dressing) minimum assist (75% patient effort);socks  -JJ (r) AD (t) JJ (c)     Position (Lower Body Dressing) edge of bed sitting  -JJ (r) AD (t) JJ (c)     Comment, (Lower Body Dressing) Pt adjusted socks min A with vcs for PLB and CGA for balance. Pt unable to reach toe impacting ability to completely don/doff socks  -JJ (r) AD (t) JJ (c)     Row Name 07/13/22 1215          Toileting Assessment/Training    [place] Level (Toileting) adjust/manage clothing;perform perineal hygiene;contact guard assist  -JJ (r) AD (t) JJ (c)     Assistive Devices (Toileting) commode, bedside without drop arms  -JJ (r) AD (t) JJ (c)     Position (Toileting) unsupported sitting;unsupported standing  -JJ (r) LITZY (t) CROW (earl)     Comment, (Toileting) S to ensure thoroughness of perineal hygiene. CGA for balance with perineal hygiene  -CROW (radha) LITZY (t) CROW (c)     Row Name 07/13/22 1215          Grooming  Assessment/Training    [place] Level (Grooming) wash face, hands;set up  -JJ (r) AD (t) JJ (c)     Position (Grooming) unsupported standing  -JJ (r) AD (t) JJ (c)     Comment, (Grooming) Pt perfromed hand hygiene in standing following toileting  -JJ (r) AD (t) JJ (c)           User Key  (r) = Recorded By, (t) = Taken By, (c) = Cosigned By    Initials Name Provider Type    Mariela Le OTR/L, TODD Occupational Therapist    Cynthia Hunter, OT Student OT Student               Obj/Interventions     Row Name 07/13/22 [facility]          Shoulder (Therapeutic Exercise)    Shoulder (Therapeutic Exercise) isometric exercises  -JJ (r) AD (t) JJ (c)     Shoulder Isometrics (Therapeutic Exercise) bilateral;flexion;extension;aBduction;2 sets;10 repetitions;5 second hold  -JJ (r) AD (t) JJ (c)     Row Name 07/13/22 1215          Elbow/Forearm (Therapeutic Exercise)    Elbow/Forearm (Therapeutic Exercise) isometric exercise  -JJ (r) AD (t) JJ (c)     Elbow/Forearm Isometrics (Therapeutic Exercise) bilateral;flexion;extension;2 sets;10 repetitions;5 second hold  -JJ (r) AD (t) JJ (c)     Row Name 07/13/22 1215          Motor Skills    Therapeutic Exercise shoulder;elbow/forearm  -JJ (r) AD (t) JJ (c)           User Key  (r) = Recorded By, (t) = Taken By, (c) = Cosigned By    Initials Name Provider Type    Mariela Le OTR/L, TODD Occupational Therapist    Cynthia Hunter, OT Student OT Student               Goals/Plan    No documentation.                Clinical Impression     Row Name 07/13/22 1215          Pain Assessment    Pretreatment Pain Rating 5/10  -JJ (r) AD (t) JJ (c)     Pain Location - neck  -JJ (r) AD (t) JJ (c)     Row Name 07/13/22 [facility]          Plan of Care Review    Plan of Care Reviewed With patient;spouse  -JJ (r) AD (t) JJ (c)     Outcome Evaluation OT tx complete. Pts wife in room at arrival, with many questions regarding hospital POC and following steps. Wife desires pt return home  with HH services, and states they have use HH services in the past. Pt initally not interested in therapy this date, however with encouragement and desire to use toilet, pt willing to participate. Pt completed bed mobility with min A this date, with O2 dropping to 90, which is improved from previous session. Pt dizzy upon sitting requiring rest break before stand step t/f to toilet. Pt adjusted socks EOB SBA with vsc for PLB. Pt stood and completed t/f to toilet with CGA. Pt completed toileting skills SBA with CGA for standing balance. Pt completed sit<>stand 2x4 with RB between sets to improve endurance and safety with fxl transfers and ADL independence. Pt reports fatigue at EOS, with pain in neck. Pt and wife educated on therapy POC and benefits of mobility and exercise. Both voice understanding. Continue OT POC. Recommend d/c home with HH services for continued therapy with assist vs short term SNF pending pt progress and families ability to provide required assistance.  -JJ (r) AD (t) JJ (c)     Row Name 07/13/22 1215          Therapy Plan Review/Discharge Plan (OT)    Anticipated Discharge Disposition (OT) home with assist;home with home health  -JJ (r) AD (t) JJ (c)     Row Name 07/13/22 1215          Positioning and Restraints    Pre-Treatment Position in bed  -JJ (r) AD (t) JJ (c)     Post Treatment Position bed  -JJ (r) AD (t) JJ (c)     In Bed fowlers;call light within reach;encouraged to call for assist;side rails up x3;with family/caregiver  -JJ (r) AD (t) JJ (c)           User Key  (r) = Recorded By, (t) = Taken By, (c) = Cosigned By    Initials Name Provider Type    Mariela Le, MERCYR/L, CSRS Occupational Therapist    Cynthia Hunter, OT Student OT Student               Outcome Measures     Row Name 07/13/22 1215          How much help from another is currently needed...    Putting on and taking off regular lower body clothing? 3  -JJ (r) AD (t) JJ (c)     Bathing (including washing,  rinsing, and drying) 2  -JJ (r) AD (t) JJ (c)     Toileting (which includes using toilet bed pan or urinal) 3  -JJ (r) AD (t) JJ (c)     Putting on and taking off regular upper body clothing 3  -JJ (r) AD (t) JJ (c)     Taking care of personal grooming (such as brushing teeth) 3  -JJ (r) AD (t) JJ (c)     Eating meals 4  -JJ (r) AD (t) JJ (c)     AM-PAC 6 Clicks Score (OT) 18  -JJ (r) AD (t)     Row Name 07/13/22 1215          Functional Assessment    Outcome Measure Options AM-PAC 6 Clicks Daily Activity (OT)  -JJ (r) AD (t) JJ (c)           User Key  (r) = Recorded By, (t) = Taken By, (c) = Cosigned By    Initials Name Provider Type    Mariela Le, OTR/L, CSRS Occupational Therapist    Cynthia Hunter, OT Student OT Student                Occupational Therapy Education                 Title: PT OT SLP Therapies (In Progress)     Topic: Occupational Therapy (In Progress)     Point: ADL training (Done)     Description:   Instruct learner(s) on proper safety adaptation and remediation techniques during self care or transfers.   Instruct in proper use of assistive devices.              Learning Progress Summary           Patient Acceptance, E, VU by AD at 7/13/2022 1353    Acceptance, E, VU by AD at 7/12/2022 1533    Acceptance, E, VU by AD at 7/11/2022 1416    Comment: benefits, role of OT, PLB                   Point: Home exercise program (Not Started)     Description:   Instruct learner(s) on appropriate technique for monitoring, assisting and/or progressing therapeutic exercises/activities.              Learner Progress:  Not documented in this visit.          Point: Precautions (Done)     Description:   Instruct learner(s) on prescribed precautions during self-care and functional transfers.              Learning Progress Summary           Patient Acceptance, E, VU by AD at 7/13/2022 1353    Acceptance, E, VU by AD at 7/12/2022 1533    Acceptance, E, VU by AD at 7/11/2022 1416    Comment: benefits,  role of OT, PLB                   Point: Body mechanics (Not Started)     Description:   Instruct learner(s) on proper positioning and spine alignment during self-care, functional mobility activities and/or exercises.              Learner Progress:  Not documented in this visit.                      User Key     Initials Effective Dates Name Provider Type Discipline    AD 05/04/22 -  Cynthia Cabrera, OT Student OT Student OT              OT Recommendation and Plan  Planned Therapy Interventions (OT): activity tolerance training, BADL retraining, functional balance retraining, occupation/activity based interventions, patient/caregiver education/training, strengthening exercise, transfer/mobility retraining  Therapy Frequency (OT): 5 times/wk  Plan of Care Review  Plan of Care Reviewed With: patient, spouse  Outcome Evaluation: OT tx complete. Pts wife in room at arrival, with many questions regarding hospital POC and following steps. Wife desires pt return home with HH services, and states they have use HH services in the past. Pt initally not interested in therapy this date, however with encouragement and desire to use toilet, pt willing to participate. Pt completed bed mobility with min A this date, with O2 dropping to 90, which is improved from previous session. Pt dizzy upon sitting requiring rest break before stand step t/f to toilet. Pt adjusted socks EOB SBA with vsc for PLB. Pt stood and completed t/f to toilet with CGA. Pt completed toileting skills SBA with CGA for standing balance. Pt completed sit<>stand 2x4 with RB between sets to improve endurance and safety with fxl transfers and ADL independence. Pt reports fatigue at EOS, with pain in neck. Pt and wife educated on therapy POC and benefits of mobility and exercise. Both voice understanding. Continue OT POC. Recommend d/c home with HH services for continued therapy with assist vs short term SNF pending pt progress and families ability to provide  required assistance.     Time Calculation:    Time Calculation- OT     Row Name 07/13/22 1215             Time Calculation- OT    OT Start Time 1115  -JJ (r) AD (t) JJ (c)      OT Stop Time 1200  -JJ (r) AD (t) JJ (c)      OT Time Calculation (min) 45 min  -JJ (r) AD (t)      Total Timed Code Minutes- OT 45 minute(s)  -JJ (r) AD (t) JJ (c)      OT Received On 07/13/22  -JJ (r) AD (t) JJ (c)              Timed Charges    64855 - OT Therapeutic Exercise Minutes 20  -JJ (r) AD (t) JJ (c)      80417 - OT Self Care/Mgmt Minutes 25  -JJ (r) AD (t) JJ (c)              Total Minutes    Timed Charges Total Minutes 45  -JJ (r) AD (t)       Total Minutes 45  -JJ (r) AD (t)            User Key  (r) = Recorded By, (t) = Taken By, (c) = Cosigned By    Initials Name Provider Type    Mariela Le, DON/L, CSRS Occupational Therapist    Cynthia Hunter OT Student OT Student                       Cynthia Cabrera OT Student  7/13/2022

## 2022-07-13 NOTE — CASE MANAGEMENT/SOCIAL WORK
Continued Stay Note   [facility]     Patient Name: Isaias Cruz  MRN: [id number]  Today's Date: 7/13/2022    Admit Date: 7/9/2022     Discharge Plan     Row Name 07/13/22 1455       Plan    Plan Home with [facility]    Patient/Family in Agreement with Plan yes    Plan Comments SW spoke to pt/spouse about SNF placement.  They did not agree for referrals to be made.  Pt's spouse states she was an RN and is able to take care of him at home.  She states they have a daughter that can also help when needed.  Pt is requesting a nebulizer and wheelchair upon dc.  They are deciding if they want a hospital bed.  Pt is also current with [facility]  and they wish to resume upon dc.  SW will need resumption orders.  Pt has a percussion vest at home but the vest is too small.  They are requesting a new/bigger vest upon dc.  SW will follow for orders.               Discharge Codes    No documentation.               Expected Discharge Date and Time     Expected Discharge Date Expected Discharge Time    Jul 16, 2022             DEEPA Squires

## 2022-07-13 NOTE — PROGRESS NOTES
[facility] Medicine Services  INPATIENT PROGRESS NOTE    Patient Name: Isaias Cruz  Date of Admission: 7/9/2022  Today's Date: 07/13/22  Length of Stay: 4  Primary Care Physician: Mumtaz Teresa PA    Subjective   Chief Complaint: follow up pneumonia  HPI   Doing ok.  Afebrile.  Less SOA.  Breathing comfortably on 4L NC.          Review of Systems   Constitutional: Positive for fatigue. Negative for fever.   HENT: Negative for congestion and ear pain.    Eyes: Negative for redness and visual disturbance.   Respiratory: Positive for cough and shortness of breath. Negative for wheezing.    Cardiovascular: Negative for chest pain and palpitations.   Gastrointestinal: Negative for abdominal pain, diarrhea, nausea and vomiting.   Endocrine: Negative for cold intolerance and heat intolerance.   Genitourinary: Negative for dysuria and frequency.   Musculoskeletal: Negative for arthralgias and back pain.   Skin: Negative for rash and wound.   Neurological: Positive for weakness. Negative for dizziness and headaches.   Psychiatric/Behavioral: Negative for confusion. The patient is not nervous/anxious.           All pertinent negatives and positives are as above. All other systems have been reviewed and are negative unless otherwise stated.     Objective    Temp:  [98 °F (36.7 °C)-98.8 °F (37.1 °C)] 98.1 °F (36.7 °C)  Heart Rate:  [71-85] 76  Resp:  [16-18] 16  BP: (158-168)/(46-60) 158/46  Physical Exam  Vitals reviewed.   Constitutional:       Appearance: He is well-developed.   HENT:      Head: Normocephalic and atraumatic.      Right Ear: External ear normal.      Left Ear: External ear normal.      Nose: Nose normal.   Eyes:      General: No scleral icterus.        Right eye: No discharge.         Left eye: No discharge.      Conjunctiva/sclera: Conjunctivae normal.      Pupils: Pupils are equal, round, and reactive to light.   Neck:      Thyroid: No thyromegaly.      Trachea: No  tracheal deviation.   Cardiovascular:      Rate and Rhythm: Normal rate and regular rhythm.      Heart sounds: Normal heart sounds. No murmur heard.    No friction rub. No gallop.   Pulmonary:      Effort: Pulmonary effort is normal. No respiratory distress.      Breath sounds: No stridor. Decreased breath sounds and rhonchi present. No rales.   Chest:      Chest wall: No tenderness.   Abdominal:      General: Bowel sounds are normal. There is no distension.      Palpations: Abdomen is soft. There is no mass.      Tenderness: There is no abdominal tenderness. There is no guarding or rebound.      Hernia: No hernia is present.   Musculoskeletal:         General: No deformity. Normal range of motion.      Cervical back: Normal range of motion and neck supple.   Lymphadenopathy:      Cervical: No cervical adenopathy.   Skin:     General: Skin is warm and dry.      Coloration: Skin is not pale.      Findings: No erythema or rash.   Neurological:      Mental Status: He is alert and oriented to person, place, and time.      Cranial Nerves: No cranial nerve deficit.      Motor: No abnormal muscle tone.      Coordination: Coordination normal.      Deep Tendon Reflexes: Reflexes are normal and symmetric. Reflexes normal.   Psychiatric:         Behavior: Behavior normal.         Thought Content: Thought content normal.         Judgment: Judgment normal.             Results Review:  I have reviewed the labs, radiology results, and diagnostic studies.    Laboratory Data:   Results from last 7 days   Lab Units 07/11/22  0258 07/10/22  0443 07/09/22  1053   WBC 10*3/mm3 13.05* 16.04* 15.63*   HEMOGLOBIN g/dL 7.8* 8.3* 8.2*   HEMATOCRIT % 26.6* 27.2* 28.3*   PLATELETS 10*3/mm3 314 296 252        Results from last 7 days   Lab Units 07/13/22  0409 07/12/22  0347 07/11/22  0257   SODIUM mmol/L 141 142 141   POTASSIUM mmol/L 3.5 3.6 4.3   CHLORIDE mmol/L 97* 100 103   CO2 mmol/L 35.0* 36.0* 31.0*   BUN mg/dL 25* 26* 34*   CREATININE  mg/dL 1.08 1.17 1.49*   CALCIUM mg/dL 8.7 9.1 8.9   BILIRUBIN mg/dL 0.2 0.2 0.2   ALK PHOS U/L 117 113 116   ALT (SGPT) U/L 22 19 24   AST (SGOT) U/L 18 19 19   GLUCOSE mg/dL 155* 155* 80       Culture Data:   Blood Culture   Date Value Ref Range Status   07/09/2022 No growth at 24 hours  Preliminary   07/09/2022 No growth at 24 hours  Preliminary       Radiology Data:   Imaging Results (Last 24 Hours)     ** No results found for the last 24 hours. **          I have reviewed the patient's current medications.     Assessment/Plan     Active Hospital Problems    Diagnosis    • Fluid overload      1.  Pneumonia  -IV abx      2.  Acute pulmonary edema  -IV Diuretics    3.  CAD  -ASA  -Statin  -Coreg    4.  T2DM  -Levemir  -SSI    5.  GERD  -Pepcid    6.  HTN  -Coreg    7.  COPD  -PO steroids  -nebs    8.  LATRICE on CKD  -IV diuretics  -nephrology following    9.  Acute Hypoxic Respiratory failure  -IV diuretics  -IV abx    10.  DKA  -Resolved              Discharge Planning: PT/OT.  Patient agreeable to SNF    Electronically signed by Anuj Cho MD, 07/13/22, 15:29 CDT.

## 2022-07-13 NOTE — NURSING NOTE
Pt received room assignment at start of shift. Report called. VSS per charting. A, Ox4 expresses no concerns at this time and show no obvious signs of distress. Report called and meds given per charting. Pt transported on monitor and 6L BNC. Telemetry box placed on pt on arrival to . Care transitioned to 4B team.

## 2022-07-13 NOTE — PLAN OF CARE
Goal Outcome Evaluation:    Patient's oxygen needs have been decreased from 6 lpm salter high flow nc to 3 lpm nc pulse ox readings 92-95%.

## 2022-07-14 LAB
ALBUMIN SERPL-MCNC: 2.6 G/DL (ref 3.5–5.2)
ALBUMIN/GLOB SERPL: 0.7 G/DL
ALP SERPL-CCNC: 110 U/L (ref 39–117)
ALT SERPL W P-5'-P-CCNC: 19 U/L (ref 1–41)
ANION GAP SERPL CALCULATED.3IONS-SCNC: 6 MMOL/L (ref 5–15)
AST SERPL-CCNC: 21 U/L (ref 1–40)
BACTERIA SPEC AEROBE CULT: NORMAL
BACTERIA SPEC AEROBE CULT: NORMAL
BILIRUB SERPL-MCNC: 0.2 MG/DL (ref 0–1.2)
BUN SERPL-MCNC: 28 MG/DL (ref 8–23)
BUN/CREAT SERPL: 21.1 (ref 7–25)
CALCIUM SPEC-SCNC: 9 MG/DL (ref 8.6–10.5)
CHLORIDE SERPL-SCNC: 96 MMOL/L (ref 98–107)
CO2 SERPL-SCNC: 38 MMOL/L (ref 22–29)
CREAT SERPL-MCNC: 1.33 MG/DL (ref 0.76–1.27)
DEPRECATED RDW RBC AUTO: 53.7 FL (ref 37–54)
EGFRCR SERPLBLD CKD-EPI 2021: 57.5 ML/MIN/1.73
ERYTHROCYTE [DISTWIDTH] IN BLOOD BY AUTOMATED COUNT: 16.1 % (ref 12.3–15.4)
GLOBULIN UR ELPH-MCNC: 3.6 GM/DL
GLUCOSE BLDC GLUCOMTR-MCNC: 158 MG/DL (ref 70–130)
GLUCOSE BLDC GLUCOMTR-MCNC: 187 MG/DL (ref 70–130)
GLUCOSE BLDC GLUCOMTR-MCNC: 189 MG/DL (ref 70–130)
GLUCOSE BLDC GLUCOMTR-MCNC: 25 MG/DL (ref 70–130)
GLUCOSE BLDC GLUCOMTR-MCNC: 27 MG/DL (ref 70–130)
GLUCOSE BLDC GLUCOMTR-MCNC: 325 MG/DL (ref 70–130)
GLUCOSE BLDC GLUCOMTR-MCNC: 334 MG/DL (ref 70–130)
GLUCOSE BLDC GLUCOMTR-MCNC: 412 MG/DL (ref 70–130)
GLUCOSE SERPL-MCNC: 195 MG/DL (ref 65–99)
HCT VFR BLD AUTO: 30 % (ref 37.5–51)
HGB BLD-MCNC: 9 G/DL (ref 13–17.7)
MCH RBC QN AUTO: 27.4 PG (ref 26.6–33)
MCHC RBC AUTO-ENTMCNC: 30 G/DL (ref 31.5–35.7)
MCV RBC AUTO: 91.2 FL (ref 79–97)
PLATELET # BLD AUTO: 389 10*3/MM3 (ref 140–450)
PMV BLD AUTO: 10.4 FL (ref 6–12)
POTASSIUM SERPL-SCNC: 3.6 MMOL/L (ref 3.5–5.2)
PROT SERPL-MCNC: 6.2 G/DL (ref 6–8.5)
RBC # BLD AUTO: 3.29 10*6/MM3 (ref 4.14–5.8)
SODIUM SERPL-SCNC: 140 MMOL/L (ref 136–145)
WBC NRBC COR # BLD: 16.23 10*3/MM3 (ref 3.4–10.8)

## 2022-07-14 PROCEDURE — 94660 CPAP INITIATION&MGMT: CPT

## 2022-07-14 PROCEDURE — 63710000001 INSULIN DETEMIR PER 5 UNITS: Performed by: INTERNAL MEDICINE

## 2022-07-14 PROCEDURE — 97162 PT EVAL MOD COMPLEX 30 MIN: CPT

## 2022-07-14 PROCEDURE — 25010000002 CEFTRIAXONE PER 250 MG: Performed by: INTERNAL MEDICINE

## 2022-07-14 PROCEDURE — 82962 GLUCOSE BLOOD TEST: CPT

## 2022-07-14 PROCEDURE — 97535 SELF CARE MNGMENT TRAINING: CPT | Performed by: OCCUPATIONAL THERAPIST

## 2022-07-14 PROCEDURE — 94799 UNLISTED PULMONARY SVC/PX: CPT

## 2022-07-14 PROCEDURE — 63710000001 INSULIN LISPRO (HUMAN) PER 5 UNITS: Performed by: INTERNAL MEDICINE

## 2022-07-14 PROCEDURE — 94799 UNLISTED PULMONARY SVC/PX: CPT | Performed by: FAMILY MEDICINE

## 2022-07-14 PROCEDURE — 80053 COMPREHEN METABOLIC PANEL: CPT | Performed by: NURSE PRACTITIONER

## 2022-07-14 PROCEDURE — 63710000001 PREDNISONE PER 5 MG: Performed by: INTERNAL MEDICINE

## 2022-07-14 PROCEDURE — 94664 DEMO&/EVAL PT USE INHALER: CPT

## 2022-07-14 PROCEDURE — 85027 COMPLETE CBC AUTOMATED: CPT | Performed by: FAMILY MEDICINE

## 2022-07-14 PROCEDURE — 94761 N-INVAS EAR/PLS OXIMETRY MLT: CPT

## 2022-07-14 RX ORDER — SODIUM CHLORIDE 9 MG/ML
30 INJECTION, SOLUTION INTRAVENOUS CONTINUOUS
Status: DISCONTINUED | OUTPATIENT
Start: 2022-07-14 | End: 2022-07-16 | Stop reason: HOSPADM

## 2022-07-14 RX ADMIN — INSULIN LISPRO 10 UNITS: 100 INJECTION, SOLUTION INTRAVENOUS; SUBCUTANEOUS at 13:01

## 2022-07-14 RX ADMIN — ASPIRIN 81 MG: 81 TABLET ORAL at 08:15

## 2022-07-14 RX ADMIN — FUROSEMIDE 40 MG: 40 TABLET ORAL at 08:15

## 2022-07-14 RX ADMIN — INSULIN LISPRO 10 UNITS: 100 INJECTION, SOLUTION INTRAVENOUS; SUBCUTANEOUS at 17:28

## 2022-07-14 RX ADMIN — DEXTROSE MONOHYDRATE 25 G: 500 INJECTION PARENTERAL at 05:07

## 2022-07-14 RX ADMIN — ATORVASTATIN CALCIUM 40 MG: 40 TABLET, FILM COATED ORAL at 20:17

## 2022-07-14 RX ADMIN — OXYCODONE HYDROCHLORIDE 5 MG: 5 TABLET ORAL at 13:08

## 2022-07-14 RX ADMIN — ALPRAZOLAM 1 MG: 0.5 TABLET ORAL at 21:28

## 2022-07-14 RX ADMIN — PREDNISONE 5 MG: 5 TABLET ORAL at 08:15

## 2022-07-14 RX ADMIN — OXYCODONE HYDROCHLORIDE 5 MG: 5 TABLET ORAL at 21:28

## 2022-07-14 RX ADMIN — IPRATROPIUM BROMIDE AND ALBUTEROL SULFATE 3 ML: 2.5; .5 SOLUTION RESPIRATORY (INHALATION) at 06:06

## 2022-07-14 RX ADMIN — INSULIN DETEMIR 20 UNITS: 100 INJECTION, SOLUTION SUBCUTANEOUS at 20:13

## 2022-07-14 RX ADMIN — OXYCODONE HYDROCHLORIDE 5 MG: 5 TABLET ORAL at 05:25

## 2022-07-14 RX ADMIN — IPRATROPIUM BROMIDE AND ALBUTEROL SULFATE 3 ML: 2.5; .5 SOLUTION RESPIRATORY (INHALATION) at 14:56

## 2022-07-14 RX ADMIN — CARVEDILOL 25 MG: 25 TABLET, FILM COATED ORAL at 17:28

## 2022-07-14 RX ADMIN — DOXYCYCLINE 100 MG: 100 TABLET, FILM COATED ORAL at 05:25

## 2022-07-14 RX ADMIN — INSULIN LISPRO 3 UNITS: 100 INJECTION, SOLUTION INTRAVENOUS; SUBCUTANEOUS at 08:38

## 2022-07-14 RX ADMIN — CARVEDILOL 25 MG: 25 TABLET, FILM COATED ORAL at 08:15

## 2022-07-14 RX ADMIN — SODIUM CHLORIDE 1 G: 9 INJECTION, SOLUTION INTRAVENOUS at 13:01

## 2022-07-14 RX ADMIN — DOXYCYCLINE 100 MG: 100 TABLET, FILM COATED ORAL at 17:28

## 2022-07-14 RX ADMIN — FAMOTIDINE 20 MG: 20 TABLET, FILM COATED ORAL at 08:15

## 2022-07-14 RX ADMIN — SODIUM CHLORIDE 30 ML/HR: 9 INJECTION, SOLUTION INTRAVENOUS at 10:50

## 2022-07-14 RX ADMIN — Medication 10 ML: at 20:18

## 2022-07-14 RX ADMIN — ALPRAZOLAM 1 MG: 0.5 TABLET ORAL at 05:25

## 2022-07-14 RX ADMIN — Medication 10 ML: at 08:24

## 2022-07-14 RX ADMIN — ACETAMINOPHEN 650 MG: 325 TABLET, FILM COATED ORAL at 17:28

## 2022-07-14 RX ADMIN — ALPRAZOLAM 1 MG: 0.5 TABLET ORAL at 13:08

## 2022-07-14 RX ADMIN — IPRATROPIUM BROMIDE AND ALBUTEROL SULFATE 3 ML: 2.5; .5 SOLUTION RESPIRATORY (INHALATION) at 20:07

## 2022-07-14 RX ADMIN — IPRATROPIUM BROMIDE AND ALBUTEROL SULFATE 3 ML: 2.5; .5 SOLUTION RESPIRATORY (INHALATION) at 10:02

## 2022-07-14 NOTE — PLAN OF CARE
Goal Outcome Evaluation:  Plan of Care Reviewed With: patient        Progress: improving  Outcome Evaluation: Pt. sitting EOB during midday meal.  OTR encouraged OOB activity to chair to reduce stress on back & increased activity.  Pt. able to t/f at [facility] & amb to chair at A on supplemental O2.  Min A to change gown 2' lines & O2 tubing.  Mr. Anthony arteaga's cont'd decreased act jorje as evidence by SOA.  OTR retrieved O2 tubing extension to promote in room mobility to bathroom vs BSC or urinal.  OTR edu'd in TTB for safety during bathing to reduce fall risk & promote cleanliness at home.  Cont OT tx.  Rec HH & assist from spouse.

## 2022-07-14 NOTE — PROGRESS NOTES
"    [facility]  INPATIENT PROGRESS NOTE    Patient Name: Isaias Cruz  Date of Admission: 7/9/2022  Today's Date: 07/14/22  Length of Stay: 5  Primary Care Physician: Mumtaz Teresa PA    Subjective   Chief Complaint: follow up pneumonia  HPI   Doing ok.  Afebrile.  Less SOA.  Breathing comfortably on 3L NC.  He wants to know why no one has looked into his \"blood loss.\"  He has not had black or bloody stools.  His Hgb has improved.            Review of Systems   Constitutional: Positive for fatigue. Negative for fever.   HENT: Negative for congestion and ear pain.    Eyes: Negative for redness and visual disturbance.   Respiratory: Positive for cough and shortness of breath. Negative for wheezing.    Cardiovascular: Negative for chest pain and palpitations.   Gastrointestinal: Negative for abdominal pain, diarrhea, nausea and vomiting.   Endocrine: Negative for cold intolerance and heat intolerance.   Genitourinary: Negative for dysuria and frequency.   Musculoskeletal: Negative for arthralgias and back pain.   Skin: Negative for rash and wound.   Neurological: Positive for weakness. Negative for dizziness and headaches.   Psychiatric/Behavioral: Negative for confusion. The patient is not nervous/anxious.           All pertinent negatives and positives are as above. All other systems have been reviewed and are negative unless otherwise stated.     Objective    Temp:  [98.2 °F (36.8 °C)-98.6 °F (37 °C)] 98.2 °F (36.8 °C)  Heart Rate:  [68-90] 88  Resp:  [16-18] 18  BP: ()/(44-74) 97/58  Physical Exam  Vitals reviewed.   Constitutional:       Appearance: He is well-developed.   HENT:      Head: Normocephalic and atraumatic.      Right Ear: External ear normal.      Left Ear: External ear normal.      Nose: Nose normal.   Eyes:      General: No scleral icterus.        Right eye: No discharge.         Left eye: No discharge.      Conjunctiva/sclera: Conjunctivae " normal.      Pupils: Pupils are equal, round, and reactive to light.   Neck:      Thyroid: No thyromegaly.      Trachea: No tracheal deviation.   Cardiovascular:      Rate and Rhythm: Normal rate and regular rhythm.      Heart sounds: Normal heart sounds. No murmur heard.    No friction rub. No gallop.   Pulmonary:      Effort: Pulmonary effort is normal. No respiratory distress.      Breath sounds: No stridor. Decreased breath sounds and rhonchi present. No rales.   Chest:      Chest wall: No tenderness.   Abdominal:      General: Bowel sounds are normal. There is no distension.      Palpations: Abdomen is soft. There is no mass.      Tenderness: There is no abdominal tenderness. There is no guarding or rebound.      Hernia: No hernia is present.   Musculoskeletal:         General: No deformity. Normal range of motion.      Cervical back: Normal range of motion and neck supple.   Lymphadenopathy:      Cervical: No cervical adenopathy.   Skin:     General: Skin is warm and dry.      Coloration: Skin is not pale.      Findings: No erythema or rash.   Neurological:      Mental Status: He is alert and oriented to person, place, and time.      Cranial Nerves: No cranial nerve deficit.      Motor: No abnormal muscle tone.      Coordination: Coordination normal.      Deep Tendon Reflexes: Reflexes are normal and symmetric. Reflexes normal.   Psychiatric:         Behavior: Behavior normal.         Thought Content: Thought content normal.         Judgment: Judgment normal.             Results Review:  I have reviewed the labs, radiology results, and diagnostic studies.    Laboratory Data:   Results from last 7 days   Lab Units 07/11/22  0258 07/10/22  0443 07/09/22  1053   WBC 10*3/mm3 13.05* 16.04* 15.63*   HEMOGLOBIN g/dL 7.8* 8.3* 8.2*   HEMATOCRIT % 26.6* 27.2* 28.3*   PLATELETS 10*3/mm3 314 296 252        Results from last 7 days   Lab Units 07/14/22  0710 07/13/22  0409 07/12/22  0347   SODIUM mmol/L 140 141 142    POTASSIUM mmol/L 3.6 3.5 3.6   CHLORIDE mmol/L 96* 97* 100   CO2 mmol/L 38.0* 35.0* 36.0*   BUN mg/dL 28* 25* 26*   CREATININE mg/dL 1.33* 1.08 1.17   CALCIUM mg/dL 9.0 8.7 9.1   BILIRUBIN mg/dL 0.2 0.2 0.2   ALK PHOS U/L 110 117 113   ALT (SGPT) U/L 19 22 19   AST (SGOT) U/L 21 18 19   GLUCOSE mg/dL 195* 155* 155*       Culture Data:   Blood Culture   Date Value Ref Range Status   07/09/2022 No growth at 24 hours  Preliminary   07/09/2022 No growth at 24 hours  Preliminary       Radiology Data:   Imaging Results (Last 24 Hours)     ** No results found for the last 24 hours. **          I have reviewed the patient's current medications.     Assessment/Plan     Active Hospital Problems    Diagnosis    • Fluid overload      1.  Pneumonia  -IV abx    2.  Acute pulmonary edema  -IV Diuretics    3.  CAD  -ASA  -Statin  -Coreg    4.  T2DM  -Levemir  -SSI    5.  GERD  -Pepcid    6.  HTN  -Coreg    7.  COPD  -PO steroids  -nebs    8.  LATRICE on CKD  -IV diuretics  -nephrology following    9.  Acute Hypoxic Respiratory failure  -IV diuretics  -IV abx    10.  DKA  -Resolved              Discharge Planning: PT/OT.  Patient agreeable to SNF    Electronically signed by Anuj Cho MD, 07/14/22, 11:31 CDT.

## 2022-07-14 NOTE — PLAN OF CARE
Goal Outcome Evaluation:              Outcome Evaluation: NTN assessment completed remotely. Pt did not answer room phone. Limited PO for review but pt did eat 100% of breakfast today. S/p low blood sugar this morning. Wt loss appropriate; down to 89.4kg. Continue to offer ONS and meals. NTN Following per protocol.

## 2022-07-14 NOTE — PLAN OF CARE
Goal Outcome Evaluation:  Plan of Care Reviewed With: patient        Progress: no change  Outcome Evaluation: PT Eval complete. Pt A&Ox4. Pt was in chair upon arrival and willing to participate in therapy. Pt stated he was ready to get up and move some more. Sensation intact. BLE strength grossly 4+/5. Pt states he usually uses a walker at home so he felt better using one this date. Pt performed STS transfer with CGAx1 and use of FWW for support. Pt ambulated 40 ft with CGAx1 to hallway and turned around to end session in chair. No LOB or falls were observed. Pt will benefit from skilled PT intervention in order to promote increased strength and endurance with ambulation as well as improved O2 levels. Recommend d.c to home with assist/HH pending progress.

## 2022-07-14 NOTE — THERAPY TREATMENT NOTE
Patient Name: Isaias Cruz  : 1950    MRN: [id number]                              Today's Date: 2022       Admit Date: 2022    Visit Dx:     ICD-10-CM ICD-9-CM   1. Impaired mobility and ADLs  Z74.09 V49.89    Z78.9      Patient Active Problem List   Diagnosis   • Fluid overload     Past Medical History:   Diagnosis Date   • Chronic low back pain    • CKD (chronic kidney disease)    • COPD (chronic obstructive pulmonary disease) ([facility])    • Coronary arteriosclerosis    • Diabetes mellitus ([facility])    • Gastroparesis    • GERD (gastroesophageal reflux disease)    • Hearing loss    • Hypertension    • Obesity    • Oxygen dependent      Past Surgical History:   Procedure Laterality Date   • CORONARY ARTERY BYPASS GRAFT     • THORACOSCOPY  2019   • TONSILLECTOMY        General Information     Row Name 22 1321          OT Time and Intention    Document Type therapy note (daily note)  -     Mode of Treatment occupational therapy  -     Row Name 22 1321          General Information    Patient Profile Reviewed yes  -     Existing Precautions/Restrictions oxygen therapy device and L/min;fall  -     Row Name 22 1321          Cognition    Orientation Status (Cognition) oriented x 3  -     Row Name 22 1321          Safety Issues, Functional Mobility    Impairments Affecting Function (Mobility) balance;endurance/activity tolerance;shortness of breath;strength  -           User Key  (r) = Recorded By, (t) = Taken By, (c) = Cosigned By    Initials Name Provider Type     Ivana Krishnan, OTR/L Occupational Therapist                 Mobility/ADL's     Row Name 22 1321          Transfers    Transfers sit-stand transfer;stand-sit transfer;bed-chair transfer  -     Bed-Chair [place] (Transfers) contact guard  -     Sit-Stand [place] (Transfers) contact guard;verbal cues  -     Stand-Sit [place] (Transfers) contact guard;verbal cues  -     Row Name 22  1321          Functional Mobility    Functional Mobility- Ind. Level contact guard assist  -     Functional Mobility- Comment with HHA by bed  -     Row Name 07/14/22 1321          Activities of Daily Living    BADL Assessment/Intervention feeding;upper body dressing  -[facility] Name 07/14/22 1321          Self-Feeding Assessment/Training    [place] Level (Feeding) modified independence;finger foods;liquids to mouth;knife use;prepare tray/open items;scoop food and bring to mouth  -     Position (Self-Feeding) edge of bed sitting  -[facility] Name 07/14/22 1321          Upper Body Dressing Assessment/Training    [place] Level (Upper Body Dressing) minimum assist (75% patient effort);don;doff  -     Position (Upper Body Dressing) edge of bed sitting  -     Comment, (Upper Body Dressing) hospital gown  -           User Key  (r) = Recorded By, (t) = Taken By, (c) = Cosigned By    Initials Name Provider Type     Ivana Krishnan, OTR/L Occupational Therapist               Obj/Interventions     [facility] Name 07/14/22 1528          Vision Assessment/Intervention    Visual Impairment/Limitations corrective lenses full-time  -[facility] Name 07/14/22 1528          Balance    Balance Interventions sitting;standing;sit to stand;supported;static;dynamic  -           User Key  (r) = Recorded By, (t) = Taken By, (c) = Cosigned By    Initials Name Provider Type     Ivana Krishnan, OTR/L Occupational Therapist               Goals/Plan    No documentation.                Clinical Impression     [facility] Name 07/14/22 1321          Pain Assessment    Pretreatment Pain Rating 5/10  -     Posttreatment Pain Rating 5/10  -     Pain Location generalized  -[facility] Name 07/14/22 1321          Plan of Care Review    Plan of Care Reviewed With patient  -[facility]     Progress improving  -     Outcome Evaluation Pt. sitting EOB during midday meal.  OTR encouraged OOB activity to chair to reduce stress on back & increased  activity.  Pt. able to t/f at [facility] & amb to chair at A on supplemental O2.  Min A to change gown 2' lines & O2 tubing.  Mr. Anthony arteaga's cont'd decreased act jorje as evidence by SOA.  OTR retrieved O2 tubing extension to promote in room mobility to bathroom vs BSC or urinal.  OTR edu'd in TTB for safety during bathing to reduce fall risk & promote cleanliness at home.  Cont OT tx.  Rec HH & assist from spouse.  -     Row Name 07/14/22 1321          Therapy Assessment/Plan (OT)    Patient/Family Therapy Goal Statement (OT) improve SOA  -     Rehab Potential (OT) good, to achieve stated therapy goals  -     Criteria for Skilled Therapeutic Interventions Met (OT) yes;skilled treatment is necessary  -     Therapy Frequency (OT) 5 times/wk  -     Row Name 07/14/22 1321          Therapy Plan Review/Discharge Plan (OT)    Anticipated Discharge Disposition (OT) home with assist;home with home health  -     Row Name 07/14/22 1321          Positioning and Restraints    Pre-Treatment Position in bed  -     Post Treatment Position chair  -     In Chair sitting;call light within reach;encouraged to call for assist  -           User Key  (r) = Recorded By, (t) = Taken By, (c) = Cosigned By    Initials Name Provider Type     Ivana Krishnan, OTR/L Occupational Therapist               Outcome Measures     Row Name 07/14/22 1321          How much help from another is currently needed...    Putting on and taking off regular lower body clothing? 3  -CH     Bathing (including washing, rinsing, and drying) 3  -CH     Toileting (which includes using toilet bed pan or urinal) 3  -CH     Putting on and taking off regular upper body clothing 3  -CH     Taking care of personal grooming (such as brushing teeth) 4  -CH     Eating meals 4  -CH     AM-PAC 6 Clicks Score (OT) 20  -CH     Row Name 07/14/22 3484          How much help from another person do you currently need...    Turning from your back to your side while in  flat bed without using bedrails? 3 (P)   -ILIANA     Moving from lying on back to sitting on the side of a flat bed without bedrails? 3 (P)   -ILIANA     Moving to and from a bed to a chair (including a wheelchair)? 3 (P)   -ILIANA     Standing up from a chair using your arms (e.g., wheelchair, bedside chair)? 3 (P)   -ILIANA     Climbing 3-5 steps with a railing? 2 (P)   -ILIANA     To walk in hospital room? 3 (P)   -ILIANA     AM-PAC 6 Clicks Score (PT) 17 (P)   -ILIANA     Highest level of mobility 5 --> Static standing (P)   -ILIANA     Row Name 07/14/22 1414 07/14/22 1321       Functional Assessment    Outcome Measure Options AM-PAC 6 Clicks Basic Mobility (PT) (P)   -ILIANA AM-PAC 6 Clicks Daily Activity (OT)  -          User Key  (r) = Recorded By, (t) = Taken By, (c) = Cosigned By    Initials Name Provider Type     Ivana Krishnan, OTR/L Occupational Therapist    Hayden Montoya, PT Student PT Student                Occupational Therapy Education                 Title: PT OT SLP Therapies (In Progress)     Topic: Occupational Therapy (In Progress)     Point: ADL training (Done)     Description:   Instruct learner(s) on proper safety adaptation and remediation techniques during self care or transfers.   Instruct in proper use of assistive devices.              Learning Progress Summary           Patient Acceptance, E,D, VU,NR by  at 7/14/2022 1533    Acceptance, E, VU by AD at 7/13/2022 1353    Acceptance, E, VU by AD at 7/12/2022 1533    Acceptance, E, VU by AD at 7/11/2022 1416    Comment: benefits, role of OT, PLB                   Point: Home exercise program (Not Started)     Description:   Instruct learner(s) on appropriate technique for monitoring, assisting and/or progressing therapeutic exercises/activities.              Learner Progress:  Not documented in this visit.          Point: Precautions (Done)     Description:   Instruct learner(s) on prescribed precautions during self-care and functional transfers.               Learning Progress Summary           Patient Acceptance, E,D, VU,NR by  at 7/14/2022 1533    Acceptance, E, VU by AD at 7/13/2022 1353    Acceptance, E, VU by AD at 7/12/2022 1533    Acceptance, E, VU by AD at 7/11/2022 1416    Comment: benefits, role of OT, PLB                   Point: Body mechanics (Done)     Description:   Instruct learner(s) on proper positioning and spine alignment during self-care, functional mobility activities and/or exercises.              Learning Progress Summary           Patient Acceptance, E,D, VU,NR by  at 7/14/2022 1533                               User Key     Initials Effective Dates Name Provider Type Discipline     06/16/21 -  Ivana Krishnan, OTR/L Occupational Therapist OT    AD 05/04/22 -  Cynthia Cabrera OT Student OT Student OT              OT Recommendation and Plan  Therapy Frequency (OT): 5 times/wk  Plan of Care Review  Plan of Care Reviewed With: patient  Progress: improving  Outcome Evaluation: Pt. sitting EOB during midday meal.  OTR encouraged OOB activity to chair to reduce stress on back & increased activity.  Pt. able to t/f at [facility] & amb to chair at A on supplemental O2.  Min A to change gown 2' lines & O2 tubing.  Mr. Anthony arteaga's cont'd decreased act jorje as evidence by SOA.  OTR retrieved O2 tubing extension to promote in room mobility to bathroom vs BSC or urinal.  OTR edu'd in TTB for safety during bathing to reduce fall risk & promote cleanliness at home.  Cont OT tx.  Rec HH & assist from spouse.     Time Calculation:    Time Calculation- OT     Row Name 07/14/22 1321             Time Calculation- OT    OT Start Time 1321  -CH      OT Stop Time 1400  -      OT Time Calculation (min) 39 min  -CH      Total Timed Code Minutes- OT 39 minute(s)  -      OT Received On 07/14/22  -CH              Timed Charges    37763 - OT Self Care/Mgmt Minutes 39  -CH              Total Minutes    Timed Charges Total Minutes 39  -CH       Total Minutes 39  -CH             User Key  (r) = Recorded By, (t) = Taken By, (c) = Cosigned By    Initials Name Provider Type     Ivana Krishnan OTR/L Occupational Therapist              Therapy Charges for Today     Code Description Service Date Service Provider Modifiers Qty    [id number] HC OT SELF CARE/MGMT/TRAIN EA 15 MIN 7/14/2022 Ivana Krishnan OTR/L GO 3               DON Poon/SHREYA  7/14/2022

## 2022-07-14 NOTE — PLAN OF CARE
Problem: Adult Inpatient Plan of Care  Goal: Plan of Care Review  Outcome: Ongoing, Progressing  Flowsheets (Taken 7/14/2022 0640)  Progress: no change  Plan of Care Reviewed With: patient  Outcome Evaluation: At the beginning of the shift, patient's blood sugar was: 435. MD notified. Insulin ordered & given as ordered. Good results noted. This AM, PCA went to do her morning vitals, & patient was stiff & not talking. This RN went to assess patient and blood glucose was 25 & 27. Charge nurse notified. Staff at bedside. D50 given & patient had two cups of orange juice. Blood sugar recheck was 189. MD notified. Sinus rhythm HR: 68-95 on tele. SOB with exertion noted. Pt is on 3L of O2.  in place. Pt c/o back pain, PRN PO pain medicine given as ordered. Pt c/o anxiety, PRN Xanax given as ordered. Bed alarm on. Up x 1. Safety maintained.

## 2022-07-14 NOTE — DISCHARGE PLACEMENT REQUEST
"Isaias Jones (71 y.o. Male)             Date of Birth   1950    Social Security Number       Address   [address] [address]    Home Phone   [phone]    MRN   [id number]       [place]   Other    Marital Status                               Admission Date   7/9/22    Admission Type   Urgent    Admitting Provider   Anuj Cho MD    Attending Provider   Anuj Cho MD    Department, Room/Bed   [facility] 4B, 465/1       Discharge Date       Discharge Disposition       Discharge Destination                               Attending Provider: Anuj Cho MD    Allergies: Ace Inhibitors, Bactroban [Mupirocin Calcium], Bentyl [Dicyclomine Hcl], Codeine, Cymbalta [Duloxetine Hcl], Flagyl [Metronidazole], Nifedipine, Nsaids, Penicillins, Reglan [Metoclopramide Hcl], Sulfa Antibiotics, Tetanus-diphth-acell Pertussis    Isolation: None   Infection: None   Code Status: CPR   Advance Care Planning Activity    Ht: 162.6 cm (64\")   Wt: 89.4 kg (197 lb)    Admission Cmt: None   Principal Problem: None                Active Insurance as of 7/9/2022     Primary Coverage     Payor Plan Insurance Group Employer/Plan Group    MEDICARE MEDICARE A & B      Payor Plan Address Payor Plan Phone Number Payor Plan Fax Number Effective Dates    [address] [phone]  10/1/2007 - None Entered    [facility] [id number]       Subscriber Name Subscriber Birth Date Member ID       ISAIAS JONES 1950 [id number]           Secondary Coverage     Payor Plan Insurance Group Employer/Plan Group    [facility] SUPP [id number]     Payor Plan Address Payor Plan Phone Number Payor Plan Fax Number Effective Dates    [address]   4/1/2012 - None Entered    [facility] [id number]       Subscriber Name Subscriber Birth Date Member ID       ISAIAS JONES 1950 [id number]                 Emergency Contacts      (Rel.) Home Phone Work Phone " Mobile Phone    ROBERTO CARLOS CRUZ (Spouse) [phone] -- --    ARTURO CRUZ [phone] -- --    ronnie armendariz (Daughter) -- -- [phone]            Insurance Information                MEDICARE/MEDICARE A & B Phone: [phone]    Subscriber: Isaias Cruz Subscriber#: [id number]    Group#: -- Precert#: --        ANTHEM BLUE CROSS/ANTHEM BC  SUPP Phone: --    Subscriber: Isaias Cruz Subscriber#: [id number]    Group#: [id number] Precert#: --             History & Physical      Fabian Daigle MD at 07/09/22 [facility]7              [facility] Medicine Services  HISTORY AND PHYSICAL    Date of Admission: 7/9/2022  Primary Care Physician: Mumtaz Teresa PA    Subjective     Chief Complaint: Transfer from outside hospital for concern of sepsis and pneumonia.  History of Present Illness  Patient is a transfer from [facility].  Patient is transferred here with a diagnosis of sepsis from pneumonia  Patient reportedly hypotensive and had high blood sugar.  She was transferred here on 4 L oxygen  He has 22-gauge IV left hand and a right femoral central line  Initial vital signs were 120/45, heart rate 88 respiratory rate 18 blood sugar was 514 with saturation 97% on 4 L  This information from my understanding is from EMS  Patient has polypharmacy and has 23 medications listed from the ER  Patient received 10 units of regular insulin 1 L of normal saline bolus followed by 200 mL/h, Merrem, vancomycin prior to transfer.  Patient reportedly came with nausea vomiting that started 3 AM.  Record indicates history of chronic back pain, diabetes, hyperlipidemia, hypertension, opiates (morphine 15 mg 3 times a day, oxycodone every 8 hourly as needed  Patient was scheduled for corona virus in the emergency room.  Lowest blood pressure reading noted was 96/42 at 2122-hour on July 8.    Diagnostic studies from transferring facility:  Blood gas 7.32 with PCO2 of 44 with saturation of  "94% on 4 L nasal cannula  Culture was drawn from outside hospital  Potassium was 6.2 (I did not see any record that it was stated)  Patient had hemoglobin of 6.3.  There is no reported bleeding  endorsed from the information that I have read.  Patient reportedly received a unit of packed RBC  BUN is 46 with creatinine of 3.3  Albumin is 1.9, calcium is 7.8    WBCount 15.7  Lactic acid is 1  Glucosuria present  Specific gravity 1.025  Negative nitrite leukocyte Estrace trace no significant hematuria or pyuria  proBNP 2939  Chest x-ray reportedly with interval increase in bibasilar \"pneumonia\" and small left pleural effusion.        Review of Systems   Limited. Patient recollection poor.   Very weak      Otherwise complete ROS reviewed and negative except as mentioned in the HPI.    Past Medical History:   Past Medical History:   Diagnosis Date   • Chronic low back pain    • CKD (chronic kidney disease)    • COPD (chronic obstructive pulmonary disease) ([facility])    • Coronary arteriosclerosis    • Diabetes mellitus (HCC)    • Gastroparesis    • GERD (gastroesophageal reflux disease)    • Hearing loss    • Hypertension    • Obesity    • Oxygen dependent      Past Surgical History:  Past Surgical History:   Procedure Laterality Date   • CORONARY ARTERY BYPASS GRAFT     • THORACOSCOPY  2019   • TONSILLECTOMY       Social History:  reports that he has quit smoking. He has never used smokeless tobacco.    Family History:  Currently not obtainable. Son not familiar.  Allergies:  Allergies   Allergen Reactions   • Ace Inhibitors Other (See Comments)     unknown   • Bactroban [Mupirocin Calcium] Other (See Comments)     Unknown    • Bentyl [Dicyclomine Hcl] Nausea Only   • Codeine Nausea And Vomiting   • Cymbalta [Duloxetine Hcl] Other (See Comments)     Unknown    • Flagyl [Metronidazole] Other (See Comments)     Unknown    • Nifedipine Other (See Comments)     unknown   • Nsaids Other (See Comments)     Unknown    • Penicillins " "Unknown - High Severity   • Reglan [Metoclopramide Hcl] Nausea Only   • Sulfa Antibiotics Unknown - High Severity   • Tetanus-Diphth-Acell Pertussis Rash       Medications:  There are 23 medications listed in the ER  That includes pain medication such as morphine, oxycodone vortioxetine  Antidiabetic medications  Prior to Admission medications    Not on File     I have utilized all available immediate resources to obtain, update, and review the patient's current medications.    Objective     Vital Signs: /48   Pulse 84   Temp 97.8 °F (36.6 °C) (Oral)   Resp 14   Ht 162.6 cm (64\")   Wt 96.4 kg (212 lb 8.4 oz)   SpO2 99%   BMI 36.48 kg/m²   Physical Exam   Appears chronically ill  Patient's face is very swollen  GEN: Awake, alert, interactive, in NAD  HEENT: Atraumatic, PERRLA, EOMI, Anicteric, Trachea midline, lateral posterior pharyngeal wall, no gross macroglossia  Short neck, no stridor  Lungs: CTAB, no wheezing/rales/rhonchi  Heart: RRR, +S1/s2, no rub  ABD: soft, nt/nd, +BS, no guarding/rebound  Extremities: atraumatic, no cyanosis, positive pitting edema flank and other dependent portions including legs  Skin: Scaling/flaking of skin of lower extremities   Neuro: AAOx3, no focal deficits  Psych: normal mood & affect\      Results Reviewed:  Lab Results (last 24 hours)     Procedure Component Value Units Date/Time    Lactic Acid, Plasma [[id number]]  (Normal) Collected: 07/09/22 1053    Specimen: Blood Updated: 07/09/22 1114     Lactate 1.2 mmol/L     Urinalysis With Microscopic If Indicated (No Culture) - Urine, Clean Catch [[id number]]  (Abnormal) Collected: 07/09/22 1035    Specimen: Urine, Clean Catch Updated: 07/09/22 1046     Color, UA Yellow     Appearance, UA Clear     pH, UA <=5.0     Specific Gravity, UA 1.017     Glucose, UA >=1000 mg/dL (3+)     Ketones, UA 15 mg/dL (1+)     Bilirubin, UA Negative     Blood, UA Negative     Protein, UA Negative     Leuk Esterase, UA Negative     Nitrite, " UA Negative     Urobilinogen, UA 0.2 E.U./dL    Narrative:      Urine microscopic not indicated.    POC Glucose Once [[id number]]  (Abnormal) Collected: 07/09/22 1030    Specimen: Blood Updated: 07/09/22 1042     Glucose 433 mg/dL      Comment: : ZENA Ravi ID: [id number]           Imaging Results (Last 24 Hours)     Procedure Component Value Units Date/Time    XR Chest 1 View [[id number]] Collected: 07/09/22 1051     Updated: 07/09/22 1056    Narrative:      XR CHEST 1 VW- 7/9/2022 10:39 AM CDT     HISTORY: pneumonia     COMPARISON: None.     FINDINGS:      Bilateral interlobular septal thickening with subpleural Kerley B lines.  Wedge-shaped patchy infiltrate identified in the right upper lobe with  additional patchy infiltrate in both lower lobes. Blunted left  costophrenic angle reflecting trace effusion or perhaps scarring. Mild  cardiomegaly with central pulmonary vascular congestion. Prior coronary  bypass. Remote granulomatous calcification in the anil and mediastinum.  No acute bony abnormality.       Impression:      1. Interstitial edema present with bilateral peribronchial and  interlobular septal thickening. Multifocal patchy alveolar infiltrate is  well, and both lung bases as well as in the right upper lobe, which may  reflect either atypical pneumonia or areas of developing julio pulmonary  edema.        This report was finalized on 07/09/2022 10:53 by Dr Christian Szymanski, .        I have personally reviewed and interpreted the radiology studies and ECG obtained at time of admission.     Assessment / Plan     Assessment:   There are no active hospital problems to display for this patient.    Very complicated case  Transferred here for concern of pneumonia    Problem list  · Hyperkalemia  · Significant fluid overload; hard to rule out pneumonia currently  · Likely underlying congestive heart failure probably diastolic due to reported hypertension as high as 200  · Acute kidney  injury  · Anemia with hemoglobin of 6.3 from transferring facility.  Patient received 1 unit of packed RBC from transferring facility.  Patient has an upcoming endoscopic exam and colonoscopy  · Hyperglycemia in a diabetic  · Morbid obesity  · Chronic O2 dependent/chronic respiratory failure hypoxic  · COPD not grossly in exacerbation  · Progressive generalized weakness    Plan:   Will give treatment for hyperkalemia based on treatment protocol.  Check EKG    Lasix IV every 12 hourly.  Monitor renal function, fluid status, urine output.  Renal work-up ongoing.  Creatinine improved so far from bolus of fluid given.  Echocardiogram; as needed levophed to maintain systolic blood pressure greater than 90 or mean arterial pressure greater than 65    Check renal ultrasound.  Okay to use condom catheter.  As needed bladder scan.  Discussed about urinary retention protocol as needed    Sliding scale insulin for now check A1c    Oxygen, bronchodilator, will review home medication    Venous ultrasound of lower extremities to rule out DVT bilateral lower extremities.  If negative will place SCD.  Unable to place Lovenox due to severe anemia.  Work-up in progress      Empiric abxs, follow cultures    Stress ulcer proph    Critical care time at least 45 mins  Prognosis guarded    Code Status/Advanced Care Plan: Full code  The patient's surrogate decision maker is wife  I discussed my findings and recommendations with the son and wife. Discussed with nurse Zhao  Estimated length of stay is tbd    The patient was seen and examined by me on     Electronically signed by Fabian Daigle MD, 07/09/22, 11:27 CDT.      [facility] is asking me if patient is septic.  Patient was transferred here because concern for sepsis and pneumonia.  I have a different opinion based on my assessment.  Patient is severely fluid overloaded and from reviewing the preliminary echo at bedside patient has right ventricular systolic pressure greater than  50 suggesting that patient has pulmonary hypertension.  Again patient is fluid overloaded therefore I did not give patient bolus of fluid.  With a chest x-ray reviewed, it is hard to really tell presence of pneumonia therefore I just placed the patient on empiric antibiotics  I did not use the sepsis protocol due to above reason.  Cultures has been sent from outside hospital.  We ordered an blood culture [id number].        Electronically signed by Fabian Daigle MD, 07/09/22, 4:41 PM CDT.     I revisited the patient as timed above.  Patient still has persistent hyperkalemia despite the D50, insulin, calcium gluconate.  Apparently the Lokelma ordered earlier got dropped off.  I do not know how.  Patient persist to have hyperglycemia as well.  Current diagnostic studies now shows anion gap of 22 and CO2 of 12.  Glucose 494 despite several episodes of regular insulin given  Given above information, arterial blood gas ordered,.  I will start the patient on insulin drip via Glucomander with goal between 1 40-1 80.  I reordered the Lokelma  I am giving sodium bicarbonate IV 1 ampoule (metabolic acidosis, hyperkalemia)  I did not place the patient on bicarbonate drip because patient is fluid overloaded  I am continuing on the diuretic despite a slight increase in his creatinine  I am concerned that he may end up needing dialysis if potassium, renal function continued to worsen.  Therefore I am consulting nephrology now    Official echocardiogram now available as outlined below with normal EF but as mentioned above has a mildly reduced right ventricular systolic function and moderate pulmonary hypertension.  Results for orders placed during the hospital encounter of 07/09/22    Adult Transthoracic Echo Complete W/ Cont if Necessary Per Protocol    Interpretation Summary  · Left ventricular ejection fraction appears to be 66 - 70%. Left ventricular systolic function is normal.  · Left ventricular diastolic dysfunction is  "noted.  · The right ventricular cavity is moderately dilated.  · Mildly reduced right ventricular systolic function noted.  · Moderate pulmonary hypertension is present.    Patient given obesity may have underlying obesity hypoventilation syndrome or sleep apnea.  He is throat is crowded and has narrow opening.  He has short neck.  He has features that fits people with sleep apnea or BETTE.  We will continue on oxygen which she normally uses at 4 L and currently saturation of 92 to 98%.  May need BiPAP during sleep.  Will check ABG in relation to this as well    Discussed with nurse Zhao    Vitals:    07/09/22 1615   BP: 118/61   Pulse: 85   Resp:    Temp:    SpO2: 94%       Additional diagnosis includes  ·  hyperglycemia in the setting of diabetes  · Anion gap metabolic acidosis  · Persistent hyperkalemia  · Concern for obesity hypoventilation/obstructive sleep apnea  · Pulmonary hypertension with right-sided heart failure as evidenced by fluid retention    We need to recheck again basic metabolic panel to make sure that potassium improved in 4 to 6 hours  Additional critical care time at least 15 minutes.        Electronically signed by Fabian Daigle MD at 07/09/22 1651          Physician Progress Notes (most recent note)      Anuj Cho MD at 07/14/22 1131              [facility] Medicine Services  INPATIENT PROGRESS NOTE    Patient Name: Isaias Cruz  Date of Admission: 7/9/2022  Today's Date: 07/14/22  Length of Stay: 5  Primary Care Physician: Mumtaz Teresa PA    Subjective   Chief Complaint: follow up pneumonia  HPI   Doing ok.  Afebrile.  Less SOA.  Breathing comfortably on 3L NC.  He wants to know why no one has looked into his \"blood loss.\"  He has not had black or bloody stools.  His Hgb has improved.            Review of Systems   Constitutional: Positive for fatigue. Negative for fever.   HENT: Negative for congestion and ear pain.    Eyes: " Negative for redness and visual disturbance.   Respiratory: Positive for cough and shortness of breath. Negative for wheezing.    Cardiovascular: Negative for chest pain and palpitations.   Gastrointestinal: Negative for abdominal pain, diarrhea, nausea and vomiting.   Endocrine: Negative for cold intolerance and heat intolerance.   Genitourinary: Negative for dysuria and frequency.   Musculoskeletal: Negative for arthralgias and back pain.   Skin: Negative for rash and wound.   Neurological: Positive for weakness. Negative for dizziness and headaches.   Psychiatric/Behavioral: Negative for confusion. The patient is not nervous/anxious.           All pertinent negatives and positives are as above. All other systems have been reviewed and are negative unless otherwise stated.     Objective    Temp:  [98.2 °F (36.8 °C)-98.6 °F (37 °C)] 98.2 °F (36.8 °C)  Heart Rate:  [68-90] 88  Resp:  [16-18] 18  BP: ()/(44-74) 97/58  Physical Exam  Vitals reviewed.   Constitutional:       Appearance: He is well-developed.   HENT:      Head: Normocephalic and atraumatic.      Right Ear: External ear normal.      Left Ear: External ear normal.      Nose: Nose normal.   Eyes:      General: No scleral icterus.        Right eye: No discharge.         Left eye: No discharge.      Conjunctiva/sclera: Conjunctivae normal.      Pupils: Pupils are equal, round, and reactive to light.   Neck:      Thyroid: No thyromegaly.      Trachea: No tracheal deviation.   Cardiovascular:      Rate and Rhythm: Normal rate and regular rhythm.      Heart sounds: Normal heart sounds. No murmur heard.    No friction rub. No gallop.   Pulmonary:      Effort: Pulmonary effort is normal. No respiratory distress.      Breath sounds: No stridor. Decreased breath sounds and rhonchi present. No rales.   Chest:      Chest wall: No tenderness.   Abdominal:      General: Bowel sounds are normal. There is no distension.      Palpations: Abdomen is soft. There is no  mass.      Tenderness: There is no abdominal tenderness. There is no guarding or rebound.      Hernia: No hernia is present.   Musculoskeletal:         General: No deformity. Normal range of motion.      Cervical back: Normal range of motion and neck supple.   Lymphadenopathy:      Cervical: No cervical adenopathy.   Skin:     General: Skin is warm and dry.      Coloration: Skin is not pale.      Findings: No erythema or rash.   Neurological:      Mental Status: He is alert and oriented to person, place, and time.      Cranial Nerves: No cranial nerve deficit.      Motor: No abnormal muscle tone.      Coordination: Coordination normal.      Deep Tendon Reflexes: Reflexes are normal and symmetric. Reflexes normal.   Psychiatric:         Behavior: Behavior normal.         Thought Content: Thought content normal.         Judgment: Judgment normal.             Results Review:  I have reviewed the labs, radiology results, and diagnostic studies.    Laboratory Data:   Results from last 7 days   Lab Units 07/11/22  0258 07/10/22  0443 07/09/22  1053   WBC 10*3/mm3 13.05* 16.04* 15.63*   HEMOGLOBIN g/dL 7.8* 8.3* 8.2*   HEMATOCRIT % 26.6* 27.2* 28.3*   PLATELETS 10*3/mm3 314 296 252        Results from last 7 days   Lab Units 07/14/22  0710 07/13/22  0409 07/12/22  0347   SODIUM mmol/L 140 141 142   POTASSIUM mmol/L 3.6 3.5 3.6   CHLORIDE mmol/L 96* 97* 100   CO2 mmol/L 38.0* 35.0* 36.0*   BUN mg/dL 28* 25* 26*   CREATININE mg/dL 1.33* 1.08 1.17   CALCIUM mg/dL 9.0 8.7 9.1   BILIRUBIN mg/dL 0.2 0.2 0.2   ALK PHOS U/L 110 117 113   ALT (SGPT) U/L 19 22 19   AST (SGOT) U/L 21 18 19   GLUCOSE mg/dL 195* 155* 155*       Culture Data:   Blood Culture   Date Value Ref Range Status   07/09/2022 No growth at 24 hours  Preliminary   07/09/2022 No growth at 24 hours  Preliminary       Radiology Data:   Imaging Results (Last 24 Hours)     ** No results found for the last 24 hours. **          I have reviewed the patient's current  medications.     Assessment/Plan     Active Hospital Problems    Diagnosis    • Fluid overload      1.  Pneumonia  -IV abx    2.  Acute pulmonary edema  -IV Diuretics    3.  CAD  -ASA  -Statin  -Coreg    4.  T2DM  -Levemir  -SSI    5.  GERD  -Pepcid    6.  HTN  -Coreg    7.  COPD  -PO steroids  -nebs    8.  LATRICE on CKD  -IV diuretics  -nephrology following    9.  Acute Hypoxic Respiratory failure  -IV diuretics  -IV abx    10.  DKA  -Resolved              Discharge Planning: PT/OT.  Patient agreeable to SNF    Electronically signed by Anuj Cho MD, 07/14/22, 11:31 CDT.      Electronically signed by Anuj Cho MD at 07/14/22 1140       Physical Therapy Notes (most recent note)    No notes exist for this encounter.         Occupational Therapy Notes (most recent note)    No notes exist for this encounter.

## 2022-07-14 NOTE — PROGRESS NOTES
Continued Stay Note   [facility]     Patient Name: Isaias Cruz  MRN: [id number]  Today's Date: 7/14/2022    Admit Date: 7/9/2022     Discharge Plan     Row Name 07/14/22 1535       Plan    Plan Referral to [facility]    Plan Comments Spoke to patient and his daughter, Estelita, re discharge planning. Patient is now agreeable to [facility] in [place], [place] because his daughter worked there previously. PT eval is pending and is needed for [facility] to make definite decision. SW did fax facesheet, H&P, progress notes, and OT evaluation to [facility] at [phone]. Will fax PT eval when available.                       Expected Discharge Date and Time     Expected Discharge Date Expected Discharge Time    Jul 16, 2022             INES Thibodeaux

## 2022-07-14 NOTE — PLAN OF CARE
Problem: Adult Inpatient Plan of Care  Goal: Plan of Care Review  Outcome: Ongoing, Progressing  Flowsheets (Taken 7/14/2022 7576)  Progress: improving  Plan of Care Reviewed With: patient  Outcome Evaluation: Pt c/o chronic back pain that is improved with PRN oxycodone. PRN anxiety meds given as well. NSR on tele. Has sat up in chair most of the day. VSS. Voiding per urinal. IVF and IV ABX infused. BG very labile-was low this AM and is now high again. IS encouraged-pt only getting ~ 500. States he does not want to go to nursing home at [place]. Asking to speak to social work.

## 2022-07-14 NOTE — THERAPY EVALUATION
Patient Name: Isaias Cruz  : 1950    MRN: [id number]                              Today's Date: 2022       Admit Date: 2022    Visit Dx:     ICD-10-CM ICD-9-CM   1. Impaired mobility and ADLs  Z74.09 V49.89    Z78.9    2. Impaired mobility  Z74.09 799.89     Patient Active Problem List   Diagnosis   • Fluid overload     Past Medical History:   Diagnosis Date   • Chronic low back pain    • CKD (chronic kidney disease)    • COPD (chronic obstructive pulmonary disease) (HCC)    • Coronary arteriosclerosis    • Diabetes mellitus (HCC)    • Gastroparesis    • GERD (gastroesophageal reflux disease)    • Hearing loss    • Hypertension    • Obesity    • Oxygen dependent      Past Surgical History:   Procedure Laterality Date   • CORONARY ARTERY BYPASS GRAFT     • THORACOSCOPY  2019   • TONSILLECTOMY        General Information     Row Name 22 1414          Physical Therapy Time and Intention    Document Type evaluation  Pt presents as t/f from [facility] with possible sepsis d/t pneumonia. Dx: Fluid overload, hyperkalemia. PMH: COPD, CKD, DM II, HTN  -ADRIEN     Mode of Treatment physical therapy  -ADRIEN (r) ILIANA (t) ADRIEN (c)     Row Name 22 1414          General Information    Patient Profile Reviewed yes  -ADRIEN (r) ILIANA (t) ADRIEN (c)     Prior Level of Function independent:;all household mobility;ADL's;home management  -ADRIEN (r) ILIANA (t) ADRIEN (c)     Existing Precautions/Restrictions oxygen therapy device and L/min;fall  -ADRIEN (r) ILIANA (t) ADRIEN (c)     Barriers to Rehab medically complex  -ADRIEN (r) ILIANA (t) ADRIEN (c)     Row Name 22 141          Living Environment    People in Home spouse  -ADRIEN (r) ILIANA (t) ADRIEN (c)     Row Name 22 141          Home Main Entrance    Number of Stairs, Main Entrance two  -ADRIEN (r) ILIANA (t) ADRIEN (c)     Stair Railings, Main Entrance railing on right side (ascending)  -ADRIEN (r) ILIANA (t) ADRIEN (c)     Row Name 22 141          Stairs Within Home, Primary    Number of Stairs, Within Home, Primary  none  -ADRIEN (r) ILIANA (t) ADRIEN (c)     Stair Railings, Within Home, Primary none  -ADRIEN (r) ILIANA (t) ADRIEN (c)     Row Name 07/14/22 1414          Cognition    Orientation Status (Cognition) oriented to;person;place;situation;disoriented to;time  -DARIEN (r) ILIANA (t) ADRIEN (c)     Row Name 07/14/22 1414          Safety Issues, Functional Mobility    Impairments Affecting Function (Mobility) balance;endurance/activity tolerance;shortness of breath;strength  -ADRIEN (r) ILIANA (t) ADRIEN (c)           User Key  (r) = Recorded By, (t) = Taken By, (c) = Cosigned By    Initials Name Provider Type    Yoel Kern, PT DPT Physical Therapist    Hayden Montoya, PT Student PT Student               Mobility     Row Name 07/14/22 1414          Bed Mobility    Scooting/Bridging [place] (Bed Mobility) not tested  -ADRIEN (r) ILIANA (t) ADRIEN (c)     Supine-Sit [place] (Bed Mobility) not tested  -ADRIEN (r) ILIANA (t) ADRIEN (c)     Sit-Supine [place] (Bed Mobility) not tested  -ADRIEN (r) ILIANA (t) ADRIEN (c)     Assistive Device (Bed Mobility) --  -ADRIEN     Comment, (Bed Mobility) Pt starting position was in chair  -ADRIEN (r) ILIANA (t) ADRIEN (c)     Row Name 07/14/22 1414          Bed-Chair Transfer    Bed-Chair [place] (Transfers) not tested  -ADRIEN (r) ILIANA (t) ADRIEN (c)     Row Name 07/14/22 1414          Sit-Stand Transfer    Sit-Stand [place] (Transfers) contact guard;verbal cues  -ADRIEN (r) ILIANA (t) ADRIEN (c)     Row Name 07/14/22 1414          Gait/Stairs (Locomotion)    [place] Level (Gait) contact guard;verbal cues  -ADRIEN (r) ILIANA (t) ADRIEN (c)     Assistive Device (Gait) walker, front-wheeled  -ADRIEN (r) ILIANA (t) ADRIEN (c)     Distance in Feet (Gait) 40 ft  -ADRIEN (r) ILIANA (t) ADRIEN (c)     Deviations/Abnormal Patterns (Gait) gait speed decreased;stride length decreased  -ADRIEN (r) ILIANA (t) ADRIEN (c)     [place] Level (Stairs) not tested  -ADRIEN (r) ILIANA (t) ADRIEN (c)           User Key  (r) = Recorded By, (t) = Taken By, (c) = Cosigned By    Initials Name Provider Type    Yoel Kern PT DPT  Physical Therapist    Hayden Montoya, PT Student PT Student               Obj/Interventions     Row Name 07/14/22 1414          Range of Motion Comprehensive    General Range of Motion bilateral lower extremity ROM WFL  -ADRIEN (r) ILIANA (t) ADRIEN (c)     Row Name 07/14/22 1414          Strength Comprehensive (MMT)    General Manual Muscle Testing (MMT) Assessment lower extremity strength deficits identified  -ADRIEN (r) ILIANA (t) ADRIEN (c)     Comment, General Manual Muscle Testing (MMT) Assessment BLE strength grossly 4+/5  -ADRIEN (r) ILIANA (t) ADRIEN (c)     Row Name 07/14/22 1414          Balance    Balance Assessment sitting static balance;sitting dynamic balance;sit to stand dynamic balance;standing dynamic balance;standing static balance  -ADRIEN (r) ILIANA (t) ADRIEN (c)     Static Sitting Balance modified independence  -ADRIEN (r) ILIANA (t) ADRIEN (c)     Dynamic Sitting Balance supervision  -ADRIEN (r) ILIANA (t) ADRIEN (c)     Position, Sitting Balance unsupported;sitting in chair  -ADRIEN (r) ILIANA (t) ADRIEN (c)     Sit to Stand Dynamic Balance contact guard  -ADRIEN (r) ILIANA (t) ADRIEN (c)     Static Standing Balance contact guard  -ADRIEN (r) ILIANA (t) ADRIEN (c)     Dynamic Standing Balance contact guard;verbal cues  -ADRIEN (r) ILIANA (t) ADRIEN (c)     Position/Device Used, Standing Balance supported;walker, front-wheeled  -ADRIEN (r) ILIANA (t) ADRIEN (c)     Balance Interventions sitting;standing;sit to stand;supported;static;dynamic  -ADRIEN (r) ILIANA (t) ADRIEN (c)     Row Name 07/14/22 1414          Sensory Assessment (Somatosensory)    Sensory Assessment (Somatosensory) sensation intact  -ADRIEN (r) ILIANA (t) ADRIEN (c)           User Key  (r) = Recorded By, (t) = Taken By, (c) = Cosigned By    Initials Name Provider Type    Yoel Kern, PT DPT Physical Therapist    Hayden Montoya, PT Student PT Student               Goals/Plan     Row Name 07/14/22 1414          Bed Mobility Goal 1 (PT)    Activity/Assistive Device (Bed Mobility Goal 1, PT) sit to supine;supine to sit  -ADRIEN (r) ILIANA (t) ADRIEN (c)     [place] Level/Cues  Needed (Bed Mobility Goal 1, PT) standby assist  -ADRIEN (r) ILIANA (t) ADRIEN (c)     Time Frame (Bed Mobility Goal 1, PT) long term goal (LTG);10 days  -ADRIEN (r) ILIANA (t) ADRIEN (c)     Progress/Outcomes (Bed Mobility Goal 1, PT) goal ongoing  -ADRIEN (r) ILIANA (t) ADRIEN (c)     Row Name 07/14/22 1414          Transfer Goal 1 (PT)    Activity/Assistive Device (Transfer Goal 1, PT) sit-to-stand/stand-to-sit;bed-to-chair/chair-to-bed  -ADRIEN (r) ILIANA (t) ADRIEN (c)     [place] Level/Cues Needed (Transfer Goal 1, PT) supervision required  -ADRIEN (r) ILIANA (t) ADRIEN (c)     Time Frame (Transfer Goal 1, PT) long term goal (LTG);10 days  -ADRIEN (r) ILIANA (t) ADRIEN (c)     Progress/Outcome (Transfer Goal 1, PT) goal ongoing  -ADRIEN (r) ILIANA (t) ADRIEN (c)     Row Name 07/14/22 1414          Gait Training Goal 1 (PT)    Activity/Assistive Device (Gait Training Goal 1, PT) gait (walking locomotion);assistive device use;decrease fall risk;improve balance and speed;increase endurance/gait distance;increase energy conservation  -ADRIEN (r) ILIANA (t) ADRIEN (c)     [place] Level (Gait Training Goal 1, PT) standby assist  -ADRIEN (r) ILIANA (t) ADRIEN (c)     Distance (Gait Training Goal 1, PT) 100 ft  -ADRIEN (r) ILIANA (t) ADRIEN (c)     Time Frame (Gait Training Goal 1, PT) long term goal (LTG);10 days  -ADRIEN (r) ILIANA (t) ADRIEN (c)     Progress/Outcome (Gait Training Goal 1, PT) goal ongoing  -ADRIEN (r) ILIANA (t) ADRIEN (c)     Row Name 07/14/22 1414          Stairs Goal 1 (PT)    Activity/Assistive Device (Stairs Goal 1, PT) ascending stairs;descending stairs  -ADRIEN (r) ILIANA (t) ADRIEN (c)     [place] Level/Cues Needed (Stairs Goal 1, PT) standby assist  -ADRIEN (r) ILIANA (t) ADRIEN (c)     Number of Stairs (Stairs Goal 1, PT) 4  -ADRIEN (r) ILIANA (t) ADRIEN (c)     Time Frame (Stairs Goal 1, PT) long term goal (LTG);10 days  -ADRIEN (r) ILIANA (t) ADRIEN (c)     Progress/Outcome (Stairs Goal 1, PT) goal ongoing  -ADRIEN (r) ILIANA (t) ADRIEN (c)     Row Name 07/14/22 4130          Therapy Assessment/Plan (PT)    Planned Therapy Interventions (PT) balance training;bed mobility  training;gait training;home exercise program;patient/family education;postural re-education;stair training;strengthening;transfer training  -ADRIEN (r) ILIANA (t) ADRIEN (c)           User Key  (r) = Recorded By, (t) = Taken By, (c) = Cosigned By    Initials Name Provider Type    Yoel Kern, PT DPT Physical Therapist    Hayden Montoya, PT Student PT Student               Clinical Impression     Row Name 07/14/22 1411          Pain    Pretreatment Pain Rating 0/10 - no pain  -ADRIEN (r) ILIANA (t) ADRIEN (c)     Pre/Posttreatment Pain Comment Pt did not make remarks about back or neck pain with session  -ADRIEN (r) ILIANA (t) ADRIEN (c)     Pain Intervention(s) Medication (See MAR);Repositioned;Ambulation/increased activity  -ADRIEN (r) ILIANA (t) ADRIEN (c)     Row Name 07/14/22 1414          Plan of Care Review    Plan of Care Reviewed With patient  -ADRIEN (r) ILIANA (t) ADRIEN (c)     Progress no change  -ADRIEN (r) ILIANA (t) ADRIEN (c)     Outcome Evaluation PT Eval complete. Pt A&Ox4. Pt was in chair upon arrival and willing to participate in therapy. Pt stated he was ready to get up and move some more. Sensation intact. BLE strength grossly 4+/5. Pt states he usually uses a walker at home so he felt better using one this date. Pt performed STS transfer with CGAx1 and use of FWW for support. Pt ambulated 40 ft with CGAx1 to hallway and turned around to end session in chair. No LOB or falls were observed. Pt will benefit from skilled PT intervention in order to promote increased strength and endurance with ambulation as well as improved O2 levels. Recommend d.c to home with assist/HH pending progress.  -ADRIEN (r) ILIANA (t) ADRIEN (c)     Row Name 07/14/22 1419          Therapy Assessment/Plan (PT)    Patient/Family Therapy Goals Statement (PT) improve strength/endurance/O2  -ADRIEN (r) ILIANA (t) ADRIEN (c)     Rehab Potential (PT) good, to achieve stated therapy goals  -ADRIEN (r) ILIANA (t) ADRIEN (c)     Criteria for Skilled Interventions Met (PT) yes;skilled treatment is necessary  -ADRIEN (r) ILIANA (t) ADRIEN  (c)     Therapy Frequency (PT) 2 times/day  -ADRIEN (r) ILIANA (t) ADRIEN (c)     Predicted Duration of Therapy Intervention (PT) until d.c  -ADRIEN (r) ILIANA (t) ADRIEN (c)     Row Name 07/14/22 1414          Vital Signs    O2 Delivery Pre Treatment supplemental O2  -ADRIEN (r) ILIANA (t) ADRIEN (c)     O2 Delivery Intra Treatment supplemental O2  -ADRIEN (r) ILIANA (t) ADRIEN (c)     O2 Delivery Post Treatment supplemental O2  -ADRIEN (r) ILIANA (t) ADRIEN (c)     Pre Patient Position Sitting  -ADRIEN (r) ILIANA (t) ADRIEN (c)     Intra Patient Position Standing  -ADRIEN (r) ILIANA (t) ADRIEN (c)     Post Patient Position Sitting  -ADRIEN (r) ILIANA (t) ADRIEN (c)     Row Name 07/14/22 1414          Positioning and Restraints    Pre-Treatment Position sitting in chair/recliner  -ADRIEN (r) ILIANA (t) ADRIEN (c)     Post Treatment Position chair  -ADRIEN (r) ILIANA (t) ADRIEN (c)     In Chair notified nsg;sitting;call light within reach;encouraged to call for assist  -ADRIEN (r) ILIANA (t) ADRIEN (c)           User Key  (r) = Recorded By, (t) = Taken By, (c) = Cosigned By    Initials Name Provider Type    Yoel Kern, PT DPT Physical Therapist    Hayden Montoya, PT Student PT Student               Outcome Measures     Row Name 07/14/22 1414          How much help from another person do you currently need...    Turning from your back to your side while in flat bed without using bedrails? 3  -ADRIEN (r) ILIANA (t) ADRIEN (c)     Moving from lying on back to sitting on the side of a flat bed without bedrails? 3  -ADRIEN (r) ILIANA (t) ADRIEN (c)     Moving to and from a bed to a chair (including a wheelchair)? 3  -ADRIEN (r) ILIANA (t) ADRIEN (c)     Standing up from a chair using your arms (e.g., wheelchair, bedside chair)? 3  -ADRIEN (r) ILIANA (t) ADRIEN (c)     Climbing 3-5 steps with a railing? 2  -ADRIEN (r) ILIANA (t) ADRIEN (c)     To walk in hospital room? 3  -ADRIEN (r) ILIANA (t) ADRIEN (c)     AM-PAC 6 Clicks Score (PT) 17  -ADRIEN (r) ILIANA (t)     Highest level of mobility 5 --> Static standing  -ADRIEN (r) ILIANA (t)     Row Name 07/14/22 1414 07/14/22 1321       Functional Assessment    Outcome Measure  Options AM-PAC 6 Clicks Basic Mobility (PT)  -ADRIEN (r) ILIANA (t) ADRIEN (c) AM-PAC 6 Clicks Daily Activity (OT)  -          User Key  (r) = Recorded By, (t) = Taken By, (c) = Cosigned By    Initials Name Provider Type     Ivana Krishnan, OTR/L Occupational Therapist    Yoel Kern, PT DPT Physical Therapist    Hayden Montoya, PT Student PT Student                             Physical Therapy Education                 Title: PT OT SLP Therapies (In Progress)     Topic: Physical Therapy (In Progress)     Point: Mobility training (Done)     Learning Progress Summary           Patient Acceptance, E, VU,DU by ILIANA at 7/14/2022 5022    Comment: Pt presents to PT with sepsis and pneumonia. Pt was educated on the benefit of increased activity and ambulation to improve strength and endurance. Recommend d.c to home with assist/HH pending progress.                   Point: Home exercise program (Not Started)     Learner Progress:  Not documented in this visit.          Point: Body mechanics (Not Started)     Learner Progress:  Not documented in this visit.          Point: Precautions (Not Started)     Learner Progress:  Not documented in this visit.                      User Key     Initials Effective Dates Name Provider Type Discipline     05/04/22 -  Hayden Multani, PT Student PT Student PT              PT Recommendation and Plan  Planned Therapy Interventions (PT): balance training, bed mobility training, gait training, home exercise program, patient/family education, postural re-education, stair training, strengthening, transfer training  Plan of Care Reviewed With: patient  Progress: no change  Outcome Evaluation: PT Eval complete. Pt A&Ox4. Pt was in chair upon arrival and willing to participate in therapy. Pt stated he was ready to get up and move some more. Sensation intact. BLE strength grossly 4+/5. Pt states he usually uses a walker at home so he felt better using one this date. Pt performed STS transfer with  CGAx1 and use of FWW for support. Pt ambulated 40 ft with CGAx1 to hallway and turned around to end session in chair. No LOB or falls were observed. Pt will benefit from skilled PT intervention in order to promote increased strength and endurance with ambulation as well as improved O2 levels. Recommend d.c to home with assist/HH pending progress.     Time Calculation:    PT Charges     Row Name 07/14/22 1414             Time Calculation    Start Time 1414  -ADRIEN (r) ILIANA (t) ADRIEN (c)      Stop Time 1457  -ADRIEN (r) ILIANA (t) ADRIEN (c)      Time Calculation (min) 43 min  -ADRIEN (r) ILIANA (t)      PT Received On 07/14/22  -ADRIEN (r) ILIANA (t) ADRIEN (c)      PT Goal Re-Cert Due Date 07/24/22  -ADRIEN (r) ILIANA (t) ADRIEN (c)            User Key  (r) = Recorded By, (t) = Taken By, (c) = Cosigned By    Initials Name Provider Type    Yoel Kern, PT DPT Physical Therapist    Hayden Montoya, PT Student PT Student                  PT G-Codes  Outcome Measure Options: AM-PAC 6 Clicks Basic Mobility (PT)  AM-PAC 6 Clicks Score (PT): 17  AM-PAC 6 Clicks Score (OT): 20    Hayden Multani PT Student  7/14/2022

## 2022-07-15 LAB
ALBUMIN SERPL-MCNC: 2.4 G/DL (ref 3.5–5.2)
ALBUMIN/GLOB SERPL: 0.7 G/DL
ALP SERPL-CCNC: 96 U/L (ref 39–117)
ALT SERPL W P-5'-P-CCNC: 19 U/L (ref 1–41)
ANION GAP SERPL CALCULATED.3IONS-SCNC: 7 MMOL/L (ref 5–15)
AST SERPL-CCNC: 20 U/L (ref 1–40)
BILIRUB SERPL-MCNC: 0.2 MG/DL (ref 0–1.2)
BUN SERPL-MCNC: 31 MG/DL (ref 8–23)
BUN/CREAT SERPL: 24.8 (ref 7–25)
CALCIUM SPEC-SCNC: 8.6 MG/DL (ref 8.6–10.5)
CHLORIDE SERPL-SCNC: 99 MMOL/L (ref 98–107)
CO2 SERPL-SCNC: 35 MMOL/L (ref 22–29)
CREAT SERPL-MCNC: 1.25 MG/DL (ref 0.76–1.27)
EGFRCR SERPLBLD CKD-EPI 2021: 61.9 ML/MIN/1.73
GLOBULIN UR ELPH-MCNC: 3.4 GM/DL
GLUCOSE BLDC GLUCOMTR-MCNC: 100 MG/DL (ref 70–130)
GLUCOSE BLDC GLUCOMTR-MCNC: 154 MG/DL (ref 70–130)
GLUCOSE BLDC GLUCOMTR-MCNC: 262 MG/DL (ref 70–130)
GLUCOSE BLDC GLUCOMTR-MCNC: 312 MG/DL (ref 70–130)
GLUCOSE BLDC GLUCOMTR-MCNC: 321 MG/DL (ref 70–130)
GLUCOSE SERPL-MCNC: 224 MG/DL (ref 65–99)
POTASSIUM SERPL-SCNC: 3.9 MMOL/L (ref 3.5–5.2)
PROT SERPL-MCNC: 5.8 G/DL (ref 6–8.5)
SODIUM SERPL-SCNC: 141 MMOL/L (ref 136–145)

## 2022-07-15 PROCEDURE — 94664 DEMO&/EVAL PT USE INHALER: CPT

## 2022-07-15 PROCEDURE — 97110 THERAPEUTIC EXERCISES: CPT

## 2022-07-15 PROCEDURE — 94799 UNLISTED PULMONARY SVC/PX: CPT

## 2022-07-15 PROCEDURE — 82962 GLUCOSE BLOOD TEST: CPT

## 2022-07-15 PROCEDURE — 97116 GAIT TRAINING THERAPY: CPT

## 2022-07-15 PROCEDURE — 25010000002 CEFTRIAXONE PER 250 MG: Performed by: INTERNAL MEDICINE

## 2022-07-15 PROCEDURE — 63710000001 PREDNISONE PER 5 MG: Performed by: INTERNAL MEDICINE

## 2022-07-15 PROCEDURE — 63710000001 INSULIN DETEMIR PER 5 UNITS: Performed by: INTERNAL MEDICINE

## 2022-07-15 PROCEDURE — 63710000001 INSULIN LISPRO (HUMAN) PER 5 UNITS: Performed by: INTERNAL MEDICINE

## 2022-07-15 PROCEDURE — 80053 COMPREHEN METABOLIC PANEL: CPT | Performed by: NURSE PRACTITIONER

## 2022-07-15 RX ORDER — DEXTROSE MONOHYDRATE 25 G/50ML
25 INJECTION, SOLUTION INTRAVENOUS
Status: DISCONTINUED | OUTPATIENT
Start: 2022-07-15 | End: 2022-07-15 | Stop reason: SDUPTHER

## 2022-07-15 RX ORDER — INSULIN LISPRO 100 [IU]/ML
4-24 INJECTION, SOLUTION INTRAVENOUS; SUBCUTANEOUS
Status: DISCONTINUED | OUTPATIENT
Start: 2022-07-16 | End: 2022-07-16 | Stop reason: HOSPADM

## 2022-07-15 RX ORDER — NICOTINE POLACRILEX 4 MG
15 LOZENGE BUCCAL
Status: DISCONTINUED | OUTPATIENT
Start: 2022-07-15 | End: 2022-07-15 | Stop reason: SDUPTHER

## 2022-07-15 RX ADMIN — CARVEDILOL 25 MG: 25 TABLET, FILM COATED ORAL at 17:07

## 2022-07-15 RX ADMIN — SODIUM CHLORIDE 1 G: 9 INJECTION, SOLUTION INTRAVENOUS at 13:17

## 2022-07-15 RX ADMIN — INSULIN LISPRO 10 UNITS: 100 INJECTION, SOLUTION INTRAVENOUS; SUBCUTANEOUS at 12:34

## 2022-07-15 RX ADMIN — IPRATROPIUM BROMIDE AND ALBUTEROL SULFATE 3 ML: 2.5; .5 SOLUTION RESPIRATORY (INHALATION) at 19:50

## 2022-07-15 RX ADMIN — OXYCODONE HYDROCHLORIDE 5 MG: 5 TABLET ORAL at 13:17

## 2022-07-15 RX ADMIN — ACETAMINOPHEN 650 MG: 325 TABLET, FILM COATED ORAL at 10:17

## 2022-07-15 RX ADMIN — ATORVASTATIN CALCIUM 40 MG: 40 TABLET, FILM COATED ORAL at 21:07

## 2022-07-15 RX ADMIN — CARVEDILOL 25 MG: 25 TABLET, FILM COATED ORAL at 09:44

## 2022-07-15 RX ADMIN — OXYCODONE HYDROCHLORIDE 5 MG: 5 TABLET ORAL at 21:07

## 2022-07-15 RX ADMIN — ALPRAZOLAM 1 MG: 0.5 TABLET ORAL at 05:34

## 2022-07-15 RX ADMIN — OXYCODONE HYDROCHLORIDE 5 MG: 5 TABLET ORAL at 05:34

## 2022-07-15 RX ADMIN — FUROSEMIDE 40 MG: 40 TABLET ORAL at 09:44

## 2022-07-15 RX ADMIN — IPRATROPIUM BROMIDE AND ALBUTEROL SULFATE 3 ML: 2.5; .5 SOLUTION RESPIRATORY (INHALATION) at 14:35

## 2022-07-15 RX ADMIN — SODIUM CHLORIDE 30 ML/HR: 9 INJECTION, SOLUTION INTRAVENOUS at 04:30

## 2022-07-15 RX ADMIN — ASPIRIN 81 MG: 81 TABLET ORAL at 09:44

## 2022-07-15 RX ADMIN — INSULIN LISPRO 3 UNITS: 100 INJECTION, SOLUTION INTRAVENOUS; SUBCUTANEOUS at 17:07

## 2022-07-15 RX ADMIN — PREDNISONE 5 MG: 5 TABLET ORAL at 09:43

## 2022-07-15 RX ADMIN — IPRATROPIUM BROMIDE AND ALBUTEROL SULFATE 3 ML: 2.5; .5 SOLUTION RESPIRATORY (INHALATION) at 10:04

## 2022-07-15 RX ADMIN — INSULIN DETEMIR 20 UNITS: 100 INJECTION, SOLUTION SUBCUTANEOUS at 21:11

## 2022-07-15 RX ADMIN — DOXYCYCLINE 100 MG: 100 TABLET, FILM COATED ORAL at 17:07

## 2022-07-15 RX ADMIN — ALPRAZOLAM 1 MG: 0.5 TABLET ORAL at 13:17

## 2022-07-15 RX ADMIN — DOXYCYCLINE 100 MG: 100 TABLET, FILM COATED ORAL at 05:36

## 2022-07-15 RX ADMIN — ALPRAZOLAM 1 MG: 0.5 TABLET ORAL at 21:07

## 2022-07-15 RX ADMIN — FAMOTIDINE 20 MG: 20 TABLET, FILM COATED ORAL at 09:44

## 2022-07-15 RX ADMIN — IPRATROPIUM BROMIDE AND ALBUTEROL SULFATE 3 ML: 2.5; .5 SOLUTION RESPIRATORY (INHALATION) at 06:46

## 2022-07-15 NOTE — PLAN OF CARE
Problem: Adult Inpatient Plan of Care  Goal: Plan of Care Review  Outcome: Ongoing, Progressing  Flowsheets (Taken 7/15/2022 9392)  Progress: improving  Plan of Care Reviewed With: patient  Outcome Evaluation: patient is reluctant to learn anything different and is wanting to argue with staff. When patient becomes upset, therapeutic discussion engaged but patient is reluctant to participate or achieve a resolution. Anticipate DC tomorrow   Goal Outcome Evaluation:  Plan of Care Reviewed With: patient        Progress: improving  Outcome Evaluation: patient is reluctant to learn anything different and is wanting to argue with staff. When patient becomes upset, therapeutic discussion engaged but patient is reluctant to participate or achieve a resolution. Anticipate DC tomorrow

## 2022-07-15 NOTE — SIGNIFICANT NOTE
" Patient is upset that RN administered only 10 units of insulin due to his blood glucose reading. RN educated patient that 10 units was given due to his glucose reading of 312 mg/dl and due to his moderate-high insulin parameters ordered, this called for 10 units to be delivered.    Patient stated that this is not enough as his blood sugar will be over 400 mg/dl by dinner time, and feels RN under-dosed him. RN informed patient that glucose checks are performed before breakfast, before lunch, before dinner and before bedtime to determine how much his body needs according to the parameters set, and depending on that result, will determine how much insulin required according to his order parameters.   Patient then told RN that, \"there is no way this works for diabetics and you all must be making it up, because this is ridiculous.\"  RN asked patient how he manages his blood sugar at home, and he stated that he takes a set amount with meals, then rechecks and takes more. RN asked patient if he would like to talk to someone to order his insulin how he performs it at home and patient replied, \"Don't even fuck with it. I am going home tomorrow.\"   RN informed patient she would still notify MD and find someone else to possibly explain it better why blood glucose checks are performed prior to meals, and patient stated he would like for that to happen and to \"find this imaginary person and bring him in here.\"   MD Marquis notified  "

## 2022-07-15 NOTE — PROGRESS NOTES
[facility] Medicine Services  INPATIENT PROGRESS NOTE    Patient Name: Isaias Cruz  Date of Admission: 7/9/2022  Today's Date: 07/15/22  Length of Stay: 6  Primary Care Physician: Mumtaz Teresa PA    Subjective   Chief Complaint: follow up pneumonia  HPI   Doing ok.  Afebrile.  No SOA.  Breathing comfortably on 3L NC.  No black or bloody stools.  No abdominal pain.          Review of Systems   Constitutional: Positive for fatigue. Negative for fever.   HENT: Negative for congestion and ear pain.    Eyes: Negative for redness and visual disturbance.   Respiratory: Positive for cough and shortness of breath. Negative for wheezing.    Cardiovascular: Negative for chest pain and palpitations.   Gastrointestinal: Negative for abdominal pain, diarrhea, nausea and vomiting.   Endocrine: Negative for cold intolerance and heat intolerance.   Genitourinary: Negative for dysuria and frequency.   Musculoskeletal: Negative for arthralgias and back pain.   Skin: Negative for rash and wound.   Neurological: Positive for weakness. Negative for dizziness and headaches.   Psychiatric/Behavioral: Negative for confusion. The patient is not nervous/anxious.           All pertinent negatives and positives are as above. All other systems have been reviewed and are negative unless otherwise stated.     Objective    Temp:  [98.2 °F (36.8 °C)-98.5 °F (36.9 °C)] 98.5 °F (36.9 °C)  Heart Rate:  [75-95] 95  Resp:  [18] 18  BP: ()/(45-68) 140/50  Physical Exam  Vitals reviewed.   Constitutional:       Appearance: He is well-developed.   HENT:      Head: Normocephalic and atraumatic.      Right Ear: External ear normal.      Left Ear: External ear normal.      Nose: Nose normal.   Eyes:      General: No scleral icterus.        Right eye: No discharge.         Left eye: No discharge.      Conjunctiva/sclera: Conjunctivae normal.      Pupils: Pupils are equal, round, and reactive to light.   Neck:       Thyroid: No thyromegaly.      Trachea: No tracheal deviation.   Cardiovascular:      Rate and Rhythm: Normal rate and regular rhythm.      Heart sounds: Normal heart sounds. No murmur heard.    No friction rub. No gallop.   Pulmonary:      Effort: Pulmonary effort is normal. No respiratory distress.      Breath sounds: No stridor. Decreased breath sounds and rhonchi present. No rales.   Chest:      Chest wall: No tenderness.   Abdominal:      General: Bowel sounds are normal. There is no distension.      Palpations: Abdomen is soft. There is no mass.      Tenderness: There is no abdominal tenderness. There is no guarding or rebound.      Hernia: No hernia is present.   Musculoskeletal:         General: No deformity. Normal range of motion.      Cervical back: Normal range of motion and neck supple.   Lymphadenopathy:      Cervical: No cervical adenopathy.   Skin:     General: Skin is warm and dry.      Coloration: Skin is not pale.      Findings: No erythema or rash.   Neurological:      Mental Status: He is alert and oriented to person, place, and time.      Cranial Nerves: No cranial nerve deficit.      Motor: No abnormal muscle tone.      Coordination: Coordination normal.      Deep Tendon Reflexes: Reflexes are normal and symmetric. Reflexes normal.   Psychiatric:         Behavior: Behavior normal.         Thought Content: Thought content normal.         Judgment: Judgment normal.             Results Review:  I have reviewed the labs, radiology results, and diagnostic studies.    Laboratory Data:   Results from last 7 days   Lab Units 07/14/22  1106 07/11/22  0258 07/10/22  0443   WBC 10*3/mm3 16.23* 13.05* 16.04*   HEMOGLOBIN g/dL 9.0* 7.8* 8.3*   HEMATOCRIT % 30.0* 26.6* 27.2*   PLATELETS 10*3/mm3 389 314 296        Results from last 7 days   Lab Units 07/15/22  0338 07/14/22  0710 07/13/22  0409   SODIUM mmol/L 141 140 141   POTASSIUM mmol/L 3.9 3.6 3.5   CHLORIDE mmol/L 99 96* 97*   CO2 mmol/L 35.0* 38.0*  35.0*   BUN mg/dL 31* 28* 25*   CREATININE mg/dL 1.25 1.33* 1.08   CALCIUM mg/dL 8.6 9.0 8.7   BILIRUBIN mg/dL 0.2 0.2 0.2   ALK PHOS U/L 96 110 117   ALT (SGPT) U/L 19 19 22   AST (SGOT) U/L 20 21 18   GLUCOSE mg/dL 224* 195* 155*       Culture Data:   Blood Culture   Date Value Ref Range Status   07/09/2022 No growth at 24 hours  Preliminary   07/09/2022 No growth at 24 hours  Preliminary       Radiology Data:   Imaging Results (Last 24 Hours)     ** No results found for the last 24 hours. **          I have reviewed the patient's current medications.     Assessment/Plan     Active Hospital Problems    Diagnosis    • Fluid overload      1.  Pneumonia  -IV abx to PO    2.  Acute pulmonary edema  -PO Diuretics    3.  CAD  -ASA  -Statin  -Coreg    4.  T2DM  -Levemir  -SSI    5.  GERD  -Pepcid    6.  HTN  -Coreg    7.  COPD  -PO steroids  -nebs    8.  LATRICE on CKD  -PO diuretics  -nephrology following    9.  Acute Hypoxic Respiratory failure  -PO diuretics  -IV abx    10.  DKA  -Resolved    11.  Acute Hypercapnic Respiratory failure  -Resolved  -continue Bipap QHS  -will need home Bipap              Discharge Planning: Patient now interested in [facility].    Electronically signed by Anuj Cho MD, 07/15/22, 11:04 CDT.

## 2022-07-15 NOTE — NURSING NOTE
Wife spoke with [facility], that can take him in and is needing the  to fax a referral for his care to be faxed to [facility].

## 2022-07-15 NOTE — CASE MANAGEMENT/SOCIAL WORK
Continued Stay Note   [place]     Patient Name: Isaias Cruz  MRN: [id number]  Today's Date: 7/15/2022    Admit Date: 7/9/2022     Discharge Plan     Row Name 07/15/22 1210       Plan    Plan Comments spoke to [facility] Ramon Wade [phone] and the following are what they need to supply a Bipap: Bipap order with pressures and settings; progress notes with why bipap and the benefits of it for the patient; facesheet with demographics all faxed to [phone]    bipap pressure was 16/6               Discharge Codes    No documentation.               Expected Discharge Date and Time     Expected Discharge Date Expected Discharge Time    Jul 16, 2022             Sheila aGrcia RN

## 2022-07-16 VITALS
HEART RATE: 79 BPM | HEIGHT: 64 IN | OXYGEN SATURATION: 100 % | BODY MASS INDEX: 33.63 KG/M2 | TEMPERATURE: 97.4 F | DIASTOLIC BLOOD PRESSURE: 46 MMHG | WEIGHT: 197 LBS | RESPIRATION RATE: 18 BRPM | SYSTOLIC BLOOD PRESSURE: 161 MMHG

## 2022-07-16 LAB
ALBUMIN SERPL-MCNC: 2.9 G/DL (ref 3.5–5.2)
ALBUMIN/GLOB SERPL: 0.9 G/DL
ALP SERPL-CCNC: 99 U/L (ref 39–117)
ALT SERPL W P-5'-P-CCNC: 21 U/L (ref 1–41)
ANION GAP SERPL CALCULATED.3IONS-SCNC: 9 MMOL/L (ref 5–15)
AST SERPL-CCNC: 21 U/L (ref 1–40)
BILIRUB SERPL-MCNC: 0.2 MG/DL (ref 0–1.2)
BUN SERPL-MCNC: 25 MG/DL (ref 8–23)
BUN/CREAT SERPL: 23.8 (ref 7–25)
CALCIUM SPEC-SCNC: 9.2 MG/DL (ref 8.6–10.5)
CHLORIDE SERPL-SCNC: 99 MMOL/L (ref 98–107)
CO2 SERPL-SCNC: 32 MMOL/L (ref 22–29)
CREAT SERPL-MCNC: 1.05 MG/DL (ref 0.76–1.27)
EGFRCR SERPLBLD CKD-EPI 2021: 76.4 ML/MIN/1.73
GLOBULIN UR ELPH-MCNC: 3.2 GM/DL
GLUCOSE BLDC GLUCOMTR-MCNC: 200 MG/DL (ref 70–130)
GLUCOSE BLDC GLUCOMTR-MCNC: 284 MG/DL (ref 70–130)
GLUCOSE BLDC GLUCOMTR-MCNC: 399 MG/DL (ref 70–130)
GLUCOSE BLDC GLUCOMTR-MCNC: 71 MG/DL (ref 70–130)
GLUCOSE SERPL-MCNC: 109 MG/DL (ref 65–99)
POTASSIUM SERPL-SCNC: 4.1 MMOL/L (ref 3.5–5.2)
PROT SERPL-MCNC: 6.1 G/DL (ref 6–8.5)
SODIUM SERPL-SCNC: 140 MMOL/L (ref 136–145)

## 2022-07-16 PROCEDURE — 94761 N-INVAS EAR/PLS OXIMETRY MLT: CPT

## 2022-07-16 PROCEDURE — 94799 UNLISTED PULMONARY SVC/PX: CPT

## 2022-07-16 PROCEDURE — 63710000001 PREDNISONE PER 5 MG: Performed by: INTERNAL MEDICINE

## 2022-07-16 PROCEDURE — 82962 GLUCOSE BLOOD TEST: CPT

## 2022-07-16 PROCEDURE — 94664 DEMO&/EVAL PT USE INHALER: CPT

## 2022-07-16 PROCEDURE — 63710000001 INSULIN LISPRO (HUMAN) PER 5 UNITS: Performed by: FAMILY MEDICINE

## 2022-07-16 PROCEDURE — 80053 COMPREHEN METABOLIC PANEL: CPT | Performed by: NURSE PRACTITIONER

## 2022-07-16 PROCEDURE — 25010000002 ONDANSETRON PER 1 MG: Performed by: INTERNAL MEDICINE

## 2022-07-16 RX ORDER — DOXYCYCLINE HYCLATE 100 MG/1
100 CAPSULE ORAL 2 TIMES DAILY
Qty: 6 CAPSULE | Refills: 0 | Status: SHIPPED | OUTPATIENT
Start: 2022-07-16

## 2022-07-16 RX ADMIN — IPRATROPIUM BROMIDE AND ALBUTEROL SULFATE 3 ML: 2.5; .5 SOLUTION RESPIRATORY (INHALATION) at 10:11

## 2022-07-16 RX ADMIN — PREDNISONE 5 MG: 5 TABLET ORAL at 09:24

## 2022-07-16 RX ADMIN — FUROSEMIDE 40 MG: 40 TABLET ORAL at 09:24

## 2022-07-16 RX ADMIN — INSULIN LISPRO 20 UNITS: 100 INJECTION, SOLUTION INTRAVENOUS; SUBCUTANEOUS at 11:40

## 2022-07-16 RX ADMIN — IPRATROPIUM BROMIDE AND ALBUTEROL SULFATE 3 ML: 2.5; .5 SOLUTION RESPIRATORY (INHALATION) at 06:05

## 2022-07-16 RX ADMIN — ACETAMINOPHEN 650 MG: 325 TABLET, FILM COATED ORAL at 00:36

## 2022-07-16 RX ADMIN — OXYCODONE HYDROCHLORIDE 5 MG: 5 TABLET ORAL at 05:27

## 2022-07-16 RX ADMIN — ALPRAZOLAM 1 MG: 0.5 TABLET ORAL at 13:16

## 2022-07-16 RX ADMIN — DOXYCYCLINE 100 MG: 100 TABLET, FILM COATED ORAL at 05:27

## 2022-07-16 RX ADMIN — SODIUM CHLORIDE 30 ML/HR: 9 INJECTION, SOLUTION INTRAVENOUS at 00:33

## 2022-07-16 RX ADMIN — OXYCODONE HYDROCHLORIDE 5 MG: 5 TABLET ORAL at 13:16

## 2022-07-16 RX ADMIN — IPRATROPIUM BROMIDE AND ALBUTEROL SULFATE 3 ML: 2.5; .5 SOLUTION RESPIRATORY (INHALATION) at 14:04

## 2022-07-16 RX ADMIN — CARVEDILOL 25 MG: 25 TABLET, FILM COATED ORAL at 09:24

## 2022-07-16 RX ADMIN — ONDANSETRON 4 MG: 2 INJECTION INTRAMUSCULAR; INTRAVENOUS at 13:47

## 2022-07-16 RX ADMIN — FAMOTIDINE 20 MG: 20 TABLET, FILM COATED ORAL at 09:24

## 2022-07-16 RX ADMIN — ALPRAZOLAM 1 MG: 0.5 TABLET ORAL at 05:27

## 2022-07-16 RX ADMIN — ASPIRIN 81 MG: 81 TABLET ORAL at 09:24

## 2022-07-16 NOTE — PLAN OF CARE
Problem: Adult Inpatient Plan of Care  Goal: Plan of Care Review  Outcome: Met  Flowsheets (Taken 7/16/2022 6909)  Progress: improving  Plan of Care Reviewed With: patient  Outcome Evaluation: DC home   Goal Outcome Evaluation:  Plan of Care Reviewed With: patient        Progress: improving  Outcome Evaluation: DC home

## 2022-07-16 NOTE — DISCHARGE SUMMARY
[facility] Medicine Services  DISCHARGE SUMMARY       Date of Admission: 7/9/2022  Date of Discharge:  7/16/2022  Primary Care Physician: Mumtaz Teresa PA    Presenting Problem/Chief Complaint:  SOA    Final Discharge Diagnoses:  Active Hospital Problems    Diagnosis    • Fluid overload    1.  Pneumonia  -IV abx to PO     2.  Acute pulmonary edema  -PO Diuretics     3.  CAD  -ASA  -Statin  -Coreg     4.  T2DM  -Levemir  -SSI     5.  GERD  -Pepcid     6.  HTN  -Coreg     7.  COPD  -PO steroids  -nebs     8.  LATRICE on CKD  -PO diuretics  -nephrology following     9.  Acute Hypoxic Respiratory failure  -PO diuretics  -IV abx     10.  DKA  -Resolved               Consults:   1.  Nephrology    Procedures Performed: n/a    Pertinent Test Results:   Results for orders placed during the hospital encounter of 07/09/22    Adult Transthoracic Echo Complete W/ Cont if Necessary Per Protocol    Interpretation Summary  · Left ventricular ejection fraction appears to be 66 - 70%. Left ventricular systolic function is normal.  · Left ventricular diastolic dysfunction is noted.  · The right ventricular cavity is moderately dilated.  · Mildly reduced right ventricular systolic function noted.  · Moderate pulmonary hypertension is present.      Imaging Results (All)     Procedure Component Value Units Date/Time    XR Chest 1 View [[id number]] Collected: 07/11/22 0712     Updated: 07/11/22 0718    Narrative:      EXAM/TECHNIQUE: XR CHEST 1 VW-     INDICATION: Follow up     COMPARISON: 7/9/2022     FINDINGS:     Cardiac silhouette is stable. Marked worsening of consolidation in the  RIGHT mid and upper lung zone. No definite change in patchy LEFT lower  lobe airspace opacity. No large pleural effusion. No visible  pneumothorax. Prior median sternotomy.       Impression:         Marked worsening of consolidation in the RIGHT mid and upper lung zone.  No significant change in LEFT lower lobe  opacity.  This report was finalized on 07/11/2022 07:15 by Dr. Souleymane Fraga MD.    US Renal Bilateral [[id number]] Collected: 07/10/22 1334     Updated: 07/10/22 1338    Narrative:      US RENAL BILATERAL- 7/10/2022 11:28 AM CDT     HISTORY: rafa     COMPARISON: None       TECHNIQUE: Multiple longitudinal and transverse realtime sonographic  images of the kidneys and urinary bladder are obtained.      FINDINGS:      RIGHT KIDNEY: 10.4 x 6.1 x 6.0 cm. Diffuse cortical thinning. No solid  or cystic masses. The central echo complex is compact with no evidence  for hydronephrosis. No nephrolithiasis or abnormal perinephric fluid  collections . No hydroureter.      LEFT KIDNEY: 11.2 x 5.7 x 5.5 cm. Diffuse cortical thinning. No solid or  cystic masses. The central echo complex is compact with no evidence for  hydronephrosis. No nephrolithiasis or abnormal perinephric fluid  collections . No hydroureter.      PELVIS: Urinary bladder is completely decompressed by Zepeda catheter.       Impression:      1. Bilateral diffuse renal cortical thinning, appearance suggesting  chronic medical renal disease. No hydronephrosis.  2. Urinary bladder is completely decompressed by Zepeda catheter.        This report was finalized on 07/10/2022 13:35 by Dr Christian Szymanski, .    US Venous Doppler Lower Extremity Bilateral (duplex) [[id number]] Collected: 07/10/22 0915     Updated: 07/10/22 0918    Narrative:      History: Swelling       Impression:      Impression: There is no evidence of deep venous thrombosis or  superficial thrombophlebitis of right or left lower extremities.     Comments: Bilateral lower extremity venous duplex exam was performed  using color Doppler flow, Doppler waveform analysis, and grayscale  imaging, with and without compression. There is no evidence of deep  venous thrombosis in the common femoral, superficial femoral, popliteal,  peroneal, anterior tibial, and posterior tibial veins bilaterally. No  thrombus is  identified in the saphenofemoral junctions and greater  saphenous veins bilaterally.         This report was finalized on 07/10/2022 09:15 by Dr. Antony Kay MD.    XR Chest 1 View [[id number]] Collected: 07/09/22 1051     Updated: 07/09/22 1056    Narrative:      XR CHEST 1 VW- 7/9/2022 10:39 AM CDT     HISTORY: pneumonia     COMPARISON: None.     FINDINGS:      Bilateral interlobular septal thickening with subpleural Kerley B lines.  Wedge-shaped patchy infiltrate identified in the right upper lobe with  additional patchy infiltrate in both lower lobes. Blunted left  costophrenic angle reflecting trace effusion or perhaps scarring. Mild  cardiomegaly with central pulmonary vascular congestion. Prior coronary  bypass. Remote granulomatous calcification in the anil and mediastinum.  No acute bony abnormality.       Impression:      1. Interstitial edema present with bilateral peribronchial and  interlobular septal thickening. Multifocal patchy alveolar infiltrate is  well, and both lung bases as well as in the right upper lobe, which may  reflect either atypical pneumonia or areas of developing julio pulmonary  edema.        This report was finalized on 07/09/2022 10:53 by Dr Christian Szymanski, .          LAB RESULTS:      Lab 07/14/22  1106 07/11/22  0258 07/10/22  0443   WBC 16.23* 13.05* 16.04*   HEMOGLOBIN 9.0* 7.8* 8.3*   HEMATOCRIT 30.0* 26.6* 27.2*   PLATELETS 389 314 296   NEUTROS ABS  --  9.91* 13.43*   IMMATURE GRANS (ABS)  --  0.23* 0.39*   LYMPHS ABS  --  1.62 0.99   MONOS ABS  --  1.03* 0.98*   EOS ABS  --  0.22 0.21   MCV 91.2 91.4 91.9         Lab 07/16/22  0527 07/15/22  0338 07/14/22  0710 07/13/22  0409 07/12/22  0347   SODIUM 140 141 140 141 142   POTASSIUM 4.1 3.9 3.6 3.5 3.6   CHLORIDE 99 99 96* 97* 100   CO2 32.0* 35.0* 38.0* 35.0* 36.0*   ANION GAP 9.0 7.0 6.0 9.0 6.0   BUN 25* 31* 28* 25* 26*   CREATININE 1.05 1.25 1.33* 1.08 1.17   EGFR 76.4 61.9 57.5* 73.8 67.1   GLUCOSE 109* 224* 195* 155*  155*   CALCIUM 9.2 8.6 9.0 8.7 9.1         Lab 07/16/22  0527 07/15/22  0338 07/14/22  0710 07/13/22  0409 07/12/22  0347   TOTAL PROTEIN 6.1 5.8* 6.2 5.4* 5.9*   ALBUMIN 2.90* 2.40* 2.60* 2.80* 2.40*   GLOBULIN 3.2 3.4 3.6 2.6 3.5   ALT (SGPT) 21 19 19 22 19   AST (SGOT) 21 20 21 18 19   BILIRUBIN 0.2 0.2 0.2 0.2 0.2   ALK PHOS 99 96 110 117 113                     Lab 07/11/22  0429 07/10/22  1310 07/09/22  1715   PH, ARTERIAL 7.387 7.306* 7.158*   PCO2, ARTERIAL 53.8* 51.3* 52.3*   PO2 ART 66.1* 55.4* 67.0*   O2 SATURATION ART 93.3* 88.0* 91.3*   FIO2 50 35  --    HCO3 ART 32.3* 25.6 18.6*   BASE EXCESS ART 6.4* -0.9* -9.8*     Brief Urine Lab Results  (Last result in the past 365 days)      Color   Clarity   Blood   Leuk Est   Nitrite   Protein   CREAT   Urine HCG        07/09/22 1035             104.2         07/09/22 1035 Yellow   Clear   Negative   Negative   Negative   Negative               Microbiology Results (last 10 days)     Procedure Component Value - Date/Time    Blood Culture With IGNACIO - Blood, Arm, Right [[id number]]  (Normal) Collected: 07/09/22 1125    Lab Status: Final result Specimen: Blood from Arm, Right Updated: 07/14/22 1218     Blood Culture No growth at 5 days    Blood Culture With IGNACIO - Blood, Arm, Left [[id number]]  (Normal) Collected: 07/09/22 1113    Lab Status: Final result Specimen: Blood from Arm, Left Updated: 07/14/22 1217     Blood Culture No growth at 5 days          Chief Complaint on Day of Discharge:   Feeling better    History of Present Illness on Day of Discharge:   Doing well.  No SOA.  Breathing comfortably on 3L NC, which is his home oxygen.      Hospital Course:  The patient is a 71 y.o. male who presented to [facility] as a transfer for DKA and CHF.  There was also concern for pneumonia.  He was admitted to the ICU for further evaluation and treatment.      He was started on an insulin drip.  His DKA resolved.  He was transitioned to sq insulin.  He is  "eating and drinking without difficulty.      For his CHF, he was treated with IV Diuretics.  An echocardiogram was obtained.  This showed diastolic dysfunction with an EF of 66-70%.       For his pneumonia, he was treated with IV Abx and IV steroids.  He was transitioned to PO steroids.  His breathing continues to improve.  He is breathing comfortably on 3L NC, which is his home oxygen.  He would like to go home.    We gave consideration to home Bipap.  He has not worn bipap here in the last 4-5 days.  He has not had issues with hypercapnia.  Suspect this was an acute issue due to his illness.  He understands he may need Bipap at some point in the future.      He had an acute kidney injury.  He was initially treated with gentle IVF, but these were stopped given volume overload.  He was treated with IV diuretics.  His renal function improved.      He is comfortable with being discharged and with the discharge plan.  He was given the chance to ask questions and all questions were answered to his satisfaction.              Condition on Discharge:  stable    Physical Exam on Discharge:  /46 (BP Location: Right arm, Patient Position: Lying)   Pulse 79   Temp 97.4 °F (36.3 °C) (Axillary)   Resp 18   Ht 162.6 cm (64\")   Wt 89.4 kg (197 lb)   SpO2 100%   BMI 33.81 kg/m²   Physical Exam  Vitals reviewed.   Constitutional:       General: He is not in acute distress.     Appearance: He is not toxic-appearing.      Interventions: Nasal cannula in place.   HENT:      Head: Normocephalic and atraumatic.      Mouth/Throat:      Mouth: Mucous membranes are moist.      Pharynx: Oropharynx is clear.   Eyes:      Extraocular Movements: Extraocular movements intact.      Conjunctiva/sclera: Conjunctivae normal.      Pupils: Pupils are equal, round, and reactive to light.   Cardiovascular:      Rate and Rhythm: Normal rate and regular rhythm.      Pulses: Normal pulses.      Heart sounds: No murmur heard.  Pulmonary:      " Effort: Pulmonary effort is normal. No respiratory distress.      Breath sounds: Normal breath sounds. No wheezing or rhonchi.   Abdominal:      General: Bowel sounds are normal. There is no distension.      Palpations: Abdomen is soft.      Tenderness: There is no abdominal tenderness.   Musculoskeletal:         General: No swelling or tenderness. Normal range of motion.      Cervical back: Normal range of motion and neck supple. No muscular tenderness.   Skin:     General: Skin is warm and dry.      Findings: No erythema or rash.   Neurological:      General: No focal deficit present.      Mental Status: He is alert and oriented to person, place, and time.      Cranial Nerves: No cranial nerve deficit.      Motor: No weakness.   Psychiatric:         Mood and Affect: Mood normal.         Behavior: Behavior normal.           Discharge Disposition:  [place] [facility] [place]charge Medications:     Discharge Medications      New Medications      Instructions Start Date   doxycycline 100 MG capsule  Commonly known as: VIBRAMYCIN   100 mg, Oral, 2 Times Daily         Continue These Medications      Instructions Start Date   ALPRAZolam 1 MG tablet  Commonly known as: XANAX   1 mg, Oral, 3 Times Daily PRN      aspirin 81 MG EC tablet   81 mg, Oral, Daily      atorvastatin 40 MG tablet  Commonly known as: LIPITOR   40 mg, Oral, Nightly      carboxymethylcellulose 0.5 % solution  Commonly known as: REFRESH PLUS   1 drop, Both Eyes, Daily PRN      carvedilol 25 MG tablet  Commonly known as: COREG   25 mg, Oral, 2 Times Daily With Meals      esomeprazole 40 MG capsule  Commonly known as: nexIUM   40 mg, Oral, 2 Times Daily      ferrous gluconate 324 MG tablet  Commonly known as: FERGON   324 mg, Oral, 3 Times Daily With Meals      fluticasone 50 MCG/ACT nasal spray  Commonly known as: FLONASE   1 spray, Nasal, Daily      furosemide 40 MG tablet  Commonly known as: LASIX   40 mg, Oral, 2 Times Daily      hydrALAZINE 100 MG  tablet  Commonly known as: APRESOLINE   100 mg, Oral, Every 8 Hours      Insulin Aspart 100 UNIT/ML injection  Commonly known as: novoLOG   15 Units, Subcutaneous, 3 Times Daily Before Meals      insulin detemir 100 UNIT/ML injection  Commonly known as: LEVEMIR   50 Units, Subcutaneous, Nightly      ipratropium-albuterol 0.5-2.5 mg/3 ml nebulizer  Commonly known as: DUO-NEB   3 mL, Nebulization, 4 Times Daily      Melatonin 10 MG tablet   20 mg, Oral, Nightly      mirtazapine 45 MG tablet  Commonly known as: REMERON   45 mg, Oral, Nightly      montelukast 10 MG tablet  Commonly known as: SINGULAIR   10 mg, Oral, Nightly      Morphine 15 MG 12 hr tablet  Commonly known as: MS CONTIN   15 mg, Oral, 2 Times Daily      ondansetron 8 MG tablet  Commonly known as: ZOFRAN   8 mg, Oral, Every 8 Hours PRN      oxyCODONE 5 MG immediate release tablet  Commonly known as: ROXICODONE   5 mg, Oral, Every 8 Hours PRN      predniSONE 5 MG tablet  Commonly known as: DELTASONE   5 mg, Oral, Daily      simethicone 80 MG chewable tablet  Commonly known as: MYLICON   80 mg, Oral, 3 Times Daily PRN      spironolactone 50 MG tablet  Commonly known as: ALDACTONE   50 mg, Oral, Daily      triamcinolone 0.1 % cream  Commonly known as: KENALOG   1 application, Topical, 2 Times Daily, Apply thin layer to the affected area 1-2 times a day      Trintellix 10 MG tablet  Generic drug: Vortioxetine HBr   10 mg, Oral, Daily             Discharge Diet: ADA, heart healthy    Activity at Discharge: as tolerated    Discharge Care Plan/Instructions:   Follow up with PCP    Follow-up Appointments:   No future appointments.    Test Results Pending at Discharge: n/a    Electronically signed by Anuj Cho MD, 07/16/22, 14:30 CDT.    Time: 40 minutes

## 2022-07-16 NOTE — PLAN OF CARE
Goal Outcome Evaluation:  Plan of Care Reviewed With: patient        Progress: improving  Outcome Evaluation: VSS/4L O2 with sats above 96.  HR S68-86 per tele.  Pt had an episode of confusion.  RN walked in room with pt standing at the side of the bed with no oxygen and pt saying he woke up confused and didn't know where he was.  Checked BG which was 200.  Vitals checked and were WNL.  O2 administerd and pt became oriented to place and was back to himself.  Will continue to monitor and notify MD of any changes.

## 2022-07-17 ENCOUNTER — READMISSION MANAGEMENT (OUTPATIENT)
Dept: CALL CENTER | Facility: HOSPITAL | Age: 72
End: 2022-07-17

## 2022-07-17 NOTE — OUTREACH NOTE
Prep Survey    Flowsheet Row Responses   Religious facility patient discharged from? [place]   Is LACE score < 7 ? No   Emergency Room discharge w/ pulse ox? No   Eligibility Not Eligible   What are the reasons patient is not eligible? [facility]   Does the patient have one of the following disease processes/diagnoses(primary or secondary)? COPD/Pneumonia   Prep survey completed? Yes          PENG TA - Registered Nurse

## 2022-07-17 NOTE — THERAPY DISCHARGE NOTE
Acute Care - Physical Therapy Discharge Summary  [facility]       Patient Name: Isaias Cruz  : 1950  MRN: [id number]    Today's Date: 2022                 Admit Date: 2022      PT Recommendation and Plan    Visit Dx:    ICD-10-CM ICD-9-CM   1. Impaired mobility and ADLs  Z74.09 V49.89    Z78.9    2. Impaired mobility  Z74.09 799.89                PT Rehab Goals     Row Name 22 0710             Bed Mobility Goal 1 (PT)    Activity/Assistive Device (Bed Mobility Goal 1, PT) sit to supine;supine to sit  -      [place] Level/Cues Needed (Bed Mobility Goal 1, PT) standby assist  -      Time Frame (Bed Mobility Goal 1, PT) long term goal (LTG);10 days  -      Progress/Outcomes (Bed Mobility Goal 1, PT) goal not met  -              Transfer Goal 1 (PT)    Activity/Assistive Device (Transfer Goal 1, PT) sit-to-stand/stand-to-sit;bed-to-chair/chair-to-bed  -      [place] Level/Cues Needed (Transfer Goal 1, PT) supervision required  -      Time Frame (Transfer Goal 1, PT) long term goal (LTG);10 days  -AH      Progress/Outcome (Transfer Goal 1, PT) goal not met  -              Gait Training Goal 1 (PT)    Activity/Assistive Device (Gait Training Goal 1, PT) gait (walking locomotion);assistive device use;decrease fall risk;improve balance and speed;increase endurance/gait distance;increase energy conservation  -      [place] Level (Gait Training Goal 1, PT) standby assist  -AH      Distance (Gait Training Goal 1, PT) 100 ft  -      Time Frame (Gait Training Goal 1, PT) long term goal (LTG);10 days  -      Progress/Outcome (Gait Training Goal 1, PT) goal not met  -              Stairs Goal 1 (PT)    Activity/Assistive Device (Stairs Goal 1, PT) ascending stairs;descending stairs  -      [place] Level/Cues Needed (Stairs Goal 1, PT) standby assist  -AH      Number of Stairs (Stairs Goal 1, PT) 4  -AH      Time Frame (Stairs Goal 1, PT) long term goal (LTG);10  days  -      Progress/Outcome (Stairs Goal 1, PT) goal not met  -            User Key  (r) = Recorded By, (t) = Taken By, (c) = Cosigned By    Initials Name Provider Type Discipline     Renee Lomeli PTA Physical Therapist Assistant PT                    PT Discharge Summary  Reason for Discharge: Discharge from facility  Outcomes Achieved: Refer to plan of care for updates on goals achieved  Discharge Destination: Home with home health      Renee Lomeli PTA   7/17/2022

## 2022-07-18 NOTE — THERAPY DISCHARGE NOTE
Acute Care - Occupational Therapy Discharge Summary  [facility]     Patient Name: Isaias Cruz  : 1950  MRN: [id number]    Today's Date: 2022                 Admit Date: 2022        OT Recommendation and Plan    Visit Dx:    ICD-10-CM ICD-9-CM   1. Impaired mobility and ADLs  Z74.09 V49.89    Z78.9    2. Impaired mobility  Z74.09 799.89                OT Rehab Goals     Row Name 22 0800             Bed Mobility Goal 1 (OT)    Activity/Assistive Device (Bed Mobility Goal 1, OT) bed mobility activities, all  -TS      [place] Level/Cues Needed (Bed Mobility Goal 1, OT) minimum assist (75% or more patient effort)  -TS      Time Frame (Bed Mobility Goal 1, OT) long term goal (LTG);by discharge  -TS      Strategies/Barriers (Bed Mobility Goal 1, OT) with O2 sats >90%  -TS      Progress/Outcomes (Bed Mobility Goal 1, OT) goal not met  -TS              Transfer Goal 1 (OT)    Activity/Assistive Device (Transfer Goal 1, OT) toilet;commode, bedside without drop arms  -TS      [place] Level/Cues Needed (Transfer Goal 1, OT) minimum assist (75% or more patient effort)  -TS      Time Frame (Transfer Goal 1, OT) long term goal (LTG);by discharge  -TS      Strategies/Barriers (Transfers Goal 1, OT) with O2 sats >90  -TS      Progress/Outcome (Transfer Goal 1, OT) goal not met  -TS              Dressing Goal 1 (OT)    Activity/Device (Dressing Goal 1, OT) dressing skills, all  -TS      [place]/Cues Needed (Dressing Goal 1, OT) contact guard required;minimum assist (75% or more patient effort)  -TS      Time Frame (Dressing Goal 1, OT) long term goal (LTG);by discharge  -TS      Strategies/Barriers (Dressing Goal 1, OT) O2 sats >90%  -TS      Progress/Outcome (Dressing Goal 1, OT) goal not met  -TS              Toileting Goal 1 (OT)    Activity/Device (Toileting Goal 1, OT) toileting skills, all  -TS      [place] Level/Cues Needed (Toileting Goal 1, OT) supervision required  -TS      Time  Frame (Toileting Goal 1, OT) long term goal (LTG);by discharge  -TS      Progress/Outcome (Toileting Goal 1, OT) goal not met  -TS            User Key  (r) = Recorded By, (t) = Taken By, (c) = Cosigned By    Initials Name Provider Type Discipline    TS Pinky Sherman COTA Occupational Therapist Assistant OT                    Timed Therapy Charges  Total Units: 3    Charges  Total Units: 3    Procedure Name Documented Minutes Units Code    HC OT SELF CARE/MGMT/TRAIN EA 15 MIN 39  3    11115 (CPT®)               Documented Minutes  Total Minutes: 39    Therapy Provided Minutes    02748 - OT Self Care/Mgmt Minutes 39                    OT Discharge Summary  Anticipated Discharge Disposition (OT): home with assist  Reason for Discharge: Discharge from facility  Outcomes Achieved: Refer to plan of care for updates on goals achieved  Discharge Destination: Home with assist      DONAVON Sierra  7/18/2022

## 2022-07-19 NOTE — THERAPY TREATMENT NOTE
Acute Care - Physical Therapy Treatment Note  [facility]     Patient Name: Isaias Cruz  : 1950  MRN: [id number]  Today's Date: 2022      Visit Dx:     ICD-10-CM ICD-9-CM   1. Impaired mobility and ADLs  Z74.09 V49.89    Z78.9    2. Impaired mobility  Z74.09 799.89     Patient Active Problem List   Diagnosis   • Fluid overload     Past Medical History:   Diagnosis Date   • Chronic low back pain    • CKD (chronic kidney disease)    • COPD (chronic obstructive pulmonary disease) ([facility])    • Coronary arteriosclerosis    • Diabetes mellitus ([facility])    • Gastroparesis    • GERD (gastroesophageal reflux disease)    • Hearing loss    • Hypertension    • Obesity    • Oxygen dependent      Past Surgical History:   Procedure Laterality Date   • CORONARY ARTERY BYPASS GRAFT     • THORACOSCOPY  2019   • TONSILLECTOMY       PT Assessment (last 12 hours)     PT Evaluation and Treatment     Row Name             Wound Right distal arm Skin Tear    Wound - Properties Group Present on Hospital Admission: Y  -CC Side: Right  -CC Orientation: distal  -CC Location: arm  -CC Primary Wound Type: Skin tear  -CC     Retired Wound - Properties Group Present on Hospital Admission: Y  -CC Side: Right  -CC Orientation: distal  -CC Location: arm  -CC Primary Wound Type: Skin tear  -CC     Retired Wound - Properties Group Present on Hospital Admission: Y  -CC Side: Right  -CC Location: arm  -CC Primary Wound Type: Skin tear  -CC           User Key  (r) = Recorded By, (t) = Taken By, (c) = Cosigned By    Initials Name Provider Type    CC Jaclyn Pavon RN Registered Nurse                Physical Therapy Education                 Title: PT OT SLP Therapies (Resolved)     Topic: Physical Therapy (Resolved)     Point: Mobility training (Resolved)     Learning Progress Summary           Patient Acceptance, E, JEANNE,DU by ILIANA at 2022 1494    Comment: Pt presents to PT with sepsis and pneumonia. Pt was educated on the benefit of  increased activity and ambulation to improve strength and endurance. Recommend d.c to home with assist/HH pending progress.                   Point: Home exercise program (Resolved)     Learner Progress:  Not documented in this visit.          Point: Body mechanics (Resolved)     Learner Progress:  Not documented in this visit.          Point: Precautions (Resolved)     Learner Progress:  Not documented in this visit.                      User Key     Initials Effective Dates Name Provider Type Discipline    ILIANA 05/04/22 -  Hayden Multani, SHEREEN Student PT Student PT              PT Recommendation and Plan             Time Calculation:    PT Charges     Row Name 07/19/22 0928             Time Calculation    Start Time 1409  -KJ      Stop Time 1433  -KJ      Time Calculation (min) 24 min  -KJ      PT Received On 07/15/22  -KJ      PT Goal Re-Cert Due Date 07/24/22  -KJ              Time Calculation- PT    Total Timed Code Minutes- PT 24 minute(s)  -KJ            User Key  (r) = Recorded By, (t) = Taken By, (c) = Cosigned By    Initials Name Provider Type    Sheila Sargent, PTA Physical Therapist Assistant                  PT G-Codes  Outcome Measure Options: AM-PAC 6 Clicks Basic Mobility (PT)  AM-PAC 6 Clicks Score (PT): 17  AM-PAC 6 Clicks Score (OT): 20    Sheila Jackson PTA  7/19/2022